# Patient Record
Sex: MALE | Race: ASIAN | NOT HISPANIC OR LATINO
[De-identification: names, ages, dates, MRNs, and addresses within clinical notes are randomized per-mention and may not be internally consistent; named-entity substitution may affect disease eponyms.]

---

## 2017-11-29 ENCOUNTER — APPOINTMENT (OUTPATIENT)
Dept: RADIOLOGY | Facility: HOSPITAL | Age: 77
End: 2017-11-29
Payer: MEDICARE

## 2017-11-29 ENCOUNTER — OUTPATIENT (OUTPATIENT)
Dept: OUTPATIENT SERVICES | Facility: HOSPITAL | Age: 77
LOS: 1 days | End: 2017-11-29
Payer: COMMERCIAL

## 2017-11-29 DIAGNOSIS — Z00.8 ENCOUNTER FOR OTHER GENERAL EXAMINATION: ICD-10-CM

## 2017-11-29 PROCEDURE — 71020: CPT | Mod: 26

## 2017-11-29 PROCEDURE — 71046 X-RAY EXAM CHEST 2 VIEWS: CPT

## 2018-02-13 ENCOUNTER — APPOINTMENT (OUTPATIENT)
Dept: NEUROSURGERY | Facility: CLINIC | Age: 78
End: 2018-02-13
Payer: MEDICARE

## 2018-02-13 VITALS
BODY MASS INDEX: 30.65 KG/M2 | HEIGHT: 63 IN | WEIGHT: 173 LBS | HEART RATE: 57 BPM | SYSTOLIC BLOOD PRESSURE: 128 MMHG | DIASTOLIC BLOOD PRESSURE: 74 MMHG

## 2018-02-13 DIAGNOSIS — M54.16 RADICULOPATHY, LUMBAR REGION: ICD-10-CM

## 2018-02-13 DIAGNOSIS — M51.36 OTHER INTERVERTEBRAL DISC DEGENERATION, LUMBAR REGION: ICD-10-CM

## 2018-02-13 DIAGNOSIS — M48.061 SPINAL STENOSIS, LUMBAR REGION WITHOUT NEUROGENIC CLAUDICATION: ICD-10-CM

## 2018-02-13 PROCEDURE — 99213 OFFICE O/P EST LOW 20 MIN: CPT

## 2018-02-26 ENCOUNTER — APPOINTMENT (OUTPATIENT)
Dept: INFECTIOUS DISEASE | Facility: CLINIC | Age: 78
End: 2018-02-26
Payer: SELF-PAY

## 2018-02-26 DIAGNOSIS — Z71.89 OTHER SPECIFIED COUNSELING: ICD-10-CM

## 2018-02-26 PROCEDURE — 90717 YELLOW FEVER VACCINE SUBQ: CPT

## 2018-02-26 PROCEDURE — 90471 IMMUNIZATION ADMIN: CPT | Mod: NC

## 2018-02-26 PROCEDURE — 99401 PREV MED CNSL INDIV APPRX 15: CPT | Mod: 25

## 2018-02-26 RX ORDER — AZITHROMYCIN 250 MG/1
250 TABLET, FILM COATED ORAL
Qty: 6 | Refills: 0 | Status: ACTIVE | COMMUNITY
Start: 2018-02-26 | End: 1900-01-01

## 2018-04-09 ENCOUNTER — APPOINTMENT (OUTPATIENT)
Dept: NEUROSURGERY | Facility: CLINIC | Age: 78
End: 2018-04-09

## 2020-06-08 ENCOUNTER — APPOINTMENT (OUTPATIENT)
Dept: ORTHOPEDIC SURGERY | Facility: CLINIC | Age: 80
End: 2020-06-08
Payer: MEDICARE

## 2020-06-08 DIAGNOSIS — M75.41 IMPINGEMENT SYNDROME OF RIGHT SHOULDER: ICD-10-CM

## 2020-06-08 DIAGNOSIS — M75.42 IMPINGEMENT SYNDROME OF LEFT SHOULDER: ICD-10-CM

## 2020-06-08 PROCEDURE — 99203 OFFICE O/P NEW LOW 30 MIN: CPT | Mod: 25

## 2020-06-08 PROCEDURE — 73030 X-RAY EXAM OF SHOULDER: CPT | Mod: 50

## 2020-06-08 PROCEDURE — 20600 DRAIN/INJ JOINT/BURSA W/O US: CPT | Mod: RT

## 2020-06-08 RX ORDER — DICLOFENAC SODIUM 75 MG/1
75 TABLET, DELAYED RELEASE ORAL
Qty: 60 | Refills: 2 | Status: ACTIVE | COMMUNITY
Start: 2020-06-08 | End: 1900-01-01

## 2020-06-08 NOTE — HISTORY OF PRESENT ILLNESS
[de-identified] : DEEJAY PELLETIER is a 79 year male presenting to the office complaining of bilateral shoulder pain. Patient reports worsening pain for approximately 6 months . Patient denies injury or trauma to the area. The patient describes the pain as a dull aching, and occasionally sharp pain localized to the anterior aspect of his bilateral shoulder that is intermittent in nature R>L His symptoms are exacerbated with any movement of the shoulder. Patient reports the pain on the right is waking him up at night. \par Patient is taking Tylenol for pain relief with mild relief in symptoms. Of note patient is s/p bilateral rotator cuff repairs. The left shoulder 10/2012 performed by Dr. Pritchard  and the right in 2011 by Dr. Eller.  Patient also reports right thumb pain. Patient has a history of basilar joint osteoarthritis. Patient reports stiffness and pain localized to the base of the thumb. The pain is increased with activity and repetitive motions and alleviated with rest. Patient denies any other complaints at this time.\par

## 2020-06-08 NOTE — PROCEDURE
[de-identified] : At this point I recommended a therapeutic injection and under sterile precautions an injection of 1 cc 1% lidocaine with 0.25 cc of Kenalog and 0.25 cc of Dexamethasone - was placed into the joint of the Right basilar joint without complication, and after several minutes, the patient felt significant relief.\par \par \par

## 2020-06-08 NOTE — PHYSICAL EXAM
[de-identified] : Right Upper Extremity\par Right Upper Extremity\par o Hand :\par ¦ Inspection/Palpation : tenderness to palpation basialr thumb joint or pain with axial compression\par ¦ Range of Motion : full arc of motion in the small joints of the hand, no discomfort elicited\par ¦ Strength : all intrinsic and extrinsic hand muscles 5/5\par ¦ Stability : no joint instability on provocative testing\par o Muscle Bulk : no atrophy\par o Sensation : sensation intact to light touch\par o Skin : no skin lesions or discoloration\par o Vascular Exam : no edema or cyanosis, radial and ulnar pulses normal\par \par o Shoulder :\par ¦ Inspection/Palpation : mild tenderness to palpation greater tuberosity, no swelling, no deformities, scapular protraction.\par ¦ Range of Motion : ACTIVE FORWARD ELEVATION: Measured at 110 degrees with pain, ACTIVE EXTERNAL ROTATION: Measured at 15 degrees, ACTIVE INTERNAL ROTATION: Measured at L5 degrees\par ¦ Strength : external rotation 3/5, internal rotation 5/5, supraspinatus 3/5\par ¦ Stability : no joint instability on provocative testing\par ¦ Tests/Signs : Neer (+), Lockwood (+)\par o Upper Arm : no tenderness, no swelling, no deformities\par o Muscle Bulk : no atrophy\par o Sensation : sensation intact to light touch\par o Skin : no skin rash or discoloration\par o Vascular Exam : no edema, no cyanosis, radial and ulnar pulses normal\par \par Left Upper Extremity\par o Shoulder :\par ¦ Inspection/Palpation : no tenderness, no swelling, no deformities\par ¦ Range of Motion : ACTIVE FORWARD ELEVATION: Measured at 150 degrees, ACTIVE EXTERNAL ROTATION: Measured at 40 degrees, ACTIVE INTERNAL ROTATION: Measured at L4\par ¦ Strength : external rotation 5/5, internal rotation 5/5, supraspinatus 5/5\par ¦ Stability : no joint instability on provocative testing\par ¦ Tests/Signs : Neer (-), Lockwood (+)\par o Upper Arm : no tenderness, no swelling, no deformities\par o Muscle Bulk : no atrophy\par o Sensation : sensation intact to light touch\par o Skin : no skin rash or discoloration\par o Vascular Exam : no edema, no cyanosis, radial and ulnar pulses normal [de-identified] : o Right Shoulder : Internal/External rotation, and outlet views were obtained, there are no soft tissue abnormalities, no fractures, alignment is normal, 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

## 2020-06-08 NOTE — DISCUSSION/SUMMARY
[de-identified] : The underlying pathophysiology was reviewed in great detail with the patient as well as the various treatment options, including ice, analgesics, NSAIDs, Physical therapy, steroid injections.\par \par A prescription was provided for a MRI of the right shoulder to rule out rotator cuff tear. \par \par A prescription was provided for Diclofenac. \par \par A corticosteroid injection of the right basilar thumb joint was performed today. \par \par FU once imaging is obtained.\par \par All questions were answered, all alternatives discussed and the patient is in complete agreement with that plan. Follow-up appointment as instructed. Any issues and the patient will call or come in sooner.\par

## 2020-06-12 ENCOUNTER — OUTPATIENT (OUTPATIENT)
Dept: OUTPATIENT SERVICES | Facility: HOSPITAL | Age: 80
LOS: 1 days | End: 2020-06-12
Payer: MEDICARE

## 2020-06-12 ENCOUNTER — APPOINTMENT (OUTPATIENT)
Dept: MRI IMAGING | Facility: HOSPITAL | Age: 80
End: 2020-06-12
Payer: MEDICARE

## 2020-06-12 DIAGNOSIS — M75.121 COMPLETE ROTATOR CUFF TEAR OR RUPTURE OF RIGHT SHOULDER, NOT SPECIFIED AS TRAUMATIC: ICD-10-CM

## 2020-06-12 PROCEDURE — 73221 MRI JOINT UPR EXTREM W/O DYE: CPT

## 2020-06-12 PROCEDURE — 73221 MRI JOINT UPR EXTREM W/O DYE: CPT | Mod: 26,RT

## 2020-07-18 ENCOUNTER — EMERGENCY (EMERGENCY)
Facility: HOSPITAL | Age: 80
LOS: 1 days | Discharge: HOME | End: 2020-07-18
Attending: INTERNAL MEDICINE | Admitting: INTERNAL MEDICINE
Payer: MEDICARE

## 2020-07-18 VITALS — TEMPERATURE: 98 F

## 2020-07-18 VITALS
HEART RATE: 60 BPM | OXYGEN SATURATION: 98 % | HEIGHT: 63 IN | DIASTOLIC BLOOD PRESSURE: 66 MMHG | RESPIRATION RATE: 16 BRPM | WEIGHT: 169.09 LBS | SYSTOLIC BLOOD PRESSURE: 181 MMHG

## 2020-07-18 DIAGNOSIS — S61.412A LACERATION WITHOUT FOREIGN BODY OF LEFT HAND, INITIAL ENCOUNTER: ICD-10-CM

## 2020-07-18 PROCEDURE — 99283 EMERGENCY DEPT VISIT LOW MDM: CPT | Mod: 25

## 2020-07-18 PROCEDURE — 90715 TDAP VACCINE 7 YRS/> IM: CPT

## 2020-07-18 PROCEDURE — 99283 EMERGENCY DEPT VISIT LOW MDM: CPT

## 2020-07-18 PROCEDURE — 90471 IMMUNIZATION ADMIN: CPT

## 2020-07-18 RX ORDER — TETANUS TOXOID, REDUCED DIPHTHERIA TOXOID AND ACELLULAR PERTUSSIS VACCINE, ADSORBED 5; 2.5; 8; 8; 2.5 [IU]/.5ML; [IU]/.5ML; UG/.5ML; UG/.5ML; UG/.5ML
0.5 SUSPENSION INTRAMUSCULAR ONCE
Refills: 0 | Status: COMPLETED | OUTPATIENT
Start: 2020-07-18 | End: 2020-07-18

## 2020-07-18 RX ADMIN — TETANUS TOXOID, REDUCED DIPHTHERIA TOXOID AND ACELLULAR PERTUSSIS VACCINE, ADSORBED 0.5 MILLILITER(S): 5; 2.5; 8; 8; 2.5 SUSPENSION INTRAMUSCULAR at 13:38

## 2020-07-18 NOTE — ED PROVIDER NOTE - CLINICAL SUMMARY MEDICAL DECISION MAKING FREE TEXT BOX
laceration of dorsum left hand obtained 24 hours ago- wound care, steri strips, Tdap, strict return infection precautions discussed

## 2020-07-18 NOTE — ED PROVIDER NOTE - OBJECTIVE STATEMENT
80 y/o M h/o HTN pw laceration to the dorsum of his left hand obtained yesterday at 1pm (24 hours ago) caught on the edge of an air conditioner. Denies redness, discharge, weakness, numbness, tingling, other injuries. Last tdap unknown.

## 2020-07-18 NOTE — ED PROVIDER NOTE - NSFOLLOWUPINSTRUCTIONS_ED_ALL_ED_FT
Sterile Tape Wound Care    Some cuts and wounds can be closed using sterile tape, also called skin adhesive strips. Skin adhesive strips can be used for shallow (superficial) and simple cuts, wounds, lacerations, and some surgical incisions. These strips act in place of stitches, or in addition to stitches, to hold the edges of the wound together to allow for better healing. Unlike stitches, the adhesive strips do not require needles or anesthetic medicine for placement. The strips usually fall off on their own as the wound is healing. It is important to take proper care of your wound at home while it heals.  How to care for a sterile tape wound     Try to keep the area around your wound clean and dry. Do not allow the adhesive strips to get wet for the first 12 hours.Do not use any soaps or ointments on the wound for the first 12 hours.If a bandage (dressing) has been applied, keep it dry.Follow instructions from your health care provider about how often to change the dressing.  Wash your hands with soap and water before you change your dressing. If soap and water are not available, use hand .Change your dressing as told by your health care provider.Leave adhesive strips in place. These skin closures may need to stay in place for 2 weeks or longer. If adhesive strip edges start to loosen and curl up, you may trim the loose edges. Do not remove adhesive strips completely unless your health care provider tells you to do that.Do not scratch, rub, or pick at the wound area.Protect the wound from further injury until it is healed.Protect the wound from sun and tanning bed exposure while it is healing, and for several weeks after healing.Check the wound every day for signs of infection. Check for:  More redness, swelling, or pain.More fluid or blood.Warmth.Pus or a bad smell.Follow these instructions at home:  Take over-the-counter and prescription medicines only as told by your health care provider.Keep all follow-up visits as told by your health care provider. This is important.Contact a health care provider if:  Your adhesive strips become soaked with blood or fall off before the wound has healed. The tape will need to be replaced.You have a fever.Get help right away if:  You have chills.You develop a rash after the strips are applied.You have a red streak that goes away from the wound.You have more redness, swelling, or pain around your wound.You have more fluid or blood coming from your wound.Your wound feels warm to the touch.You have pus or a bad smell coming from your wound.Your wound breaks open.

## 2020-07-18 NOTE — ED PROVIDER NOTE - PATIENT PORTAL LINK FT
You can access the FollowMyHealth Patient Portal offered by [facility] by registering at the following website: [email or web address]. By joining [facility]’s FollowMyHealth portal, you will also be able to view your health information using other applications (apps) compatible with our system.

## 2020-07-18 NOTE — ED PROVIDER NOTE - ATTENDING CONTRIBUTION TO CARE
laceration of dorsum left hand obtained 24 hours ago- wound care, steri strips, Tdap, strict return infection precautions discussed  Dr. Main:  I have reviewed and discussed with the PA/ resident the case specifics, including the history, physical assessment, evaluation, conclusion, laboratory results, and medical plan. I agree with the contents, and conclusions. I have personally examined, and interviewed the patient.

## 2020-09-10 PROBLEM — I10 ESSENTIAL (PRIMARY) HYPERTENSION: Chronic | Status: ACTIVE | Noted: 2020-07-18

## 2020-09-17 ENCOUNTER — APPOINTMENT (OUTPATIENT)
Dept: ORTHOPEDIC SURGERY | Facility: CLINIC | Age: 80
End: 2020-09-17
Payer: MEDICARE

## 2020-09-17 VITALS — BODY MASS INDEX: 27.49 KG/M2 | HEIGHT: 65 IN | WEIGHT: 165 LBS

## 2020-09-17 DIAGNOSIS — M75.121 COMPLETE ROTATOR CUFF TEAR OR RUPTURE OF RIGHT SHOULDER, NOT SPECIFIED AS TRAUMATIC: ICD-10-CM

## 2020-09-17 PROCEDURE — 99214 OFFICE O/P EST MOD 30 MIN: CPT | Mod: 25

## 2020-09-17 PROCEDURE — 20600 DRAIN/INJ JOINT/BURSA W/O US: CPT | Mod: RT

## 2020-09-17 RX ORDER — DICLOFENAC SODIUM 10 MG/G
1 GEL TOPICAL DAILY
Qty: 1 | Refills: 3 | Status: ACTIVE | COMMUNITY
Start: 2020-09-17 | End: 1900-01-01

## 2020-09-17 NOTE — PHYSICAL EXAM
[de-identified] : Right Upper Extremity\par \par o Hand :\par ¦ Inspection/Palpation :  tenderness to palpation basilar thumb joint with palpable crepitus no pain with axial compression\par ¦ Range of Motion : full arc of motion in the small joints of the hand, no discomfort elicited\par ¦ Strength : all intrinsic and extrinsic hand muscles 5/5\par ¦ Stability : no joint instability on provocative testing\par o Muscle Bulk : no atrophy\par o Sensation : sensation intact to light touch\par o Skin : no skin lesions or discoloration\par o Vascular Exam : no edema or cyanosis, radial and ulnar pulses normal\par \par o Shoulder :\par ¦ Inspection/Palpation : mild tenderness to palpation greater tuberosity, no swelling, no deformities, scapular protraction.\par ¦ Range of Motion : ACTIVE FORWARD ELEVATION: Measured at 145 degrees with pain, ACTIVE EXTERNAL ROTATION: Measured at 30 degrees, ACTIVE INTERNAL ROTATION: Measured at L5 degrees\par ¦ Strength : external rotation 5/5, internal rotation 5/5, supraspinatus 5/5\par ¦ Stability : no joint instability on provocative testing\par ¦ Tests/Signs : Neer (+), Lockwood (+)\par o Upper Arm : no tenderness, no swelling, no deformities\par o Muscle Bulk : no atrophy\par o Sensation : sensation intact to light touch\par o Skin : no skin rash or discoloration\par o Vascular Exam : no edema, no cyanosis, radial and ulnar pulses normal\par \par Left Upper Extremity\par o Shoulder :\par ¦ Inspection/Palpation : no tenderness, no swelling, no deformities\par ¦ Range of Motion : ACTIVE FORWARD ELEVATION: Measured at 150 degrees, ACTIVE EXTERNAL ROTATION: Measured at 40 degrees, ACTIVE INTERNAL ROTATION: Measured at L4\par ¦ Strength : external rotation 5/5, internal rotation 5/5, supraspinatus 5/5\par ¦ Stability : no joint instability on provocative testing\par ¦ Tests/Signs : Neer (-), Lockwood (+)\par o Upper Arm : no tenderness, no swelling, no deformities\par o Muscle Bulk : no atrophy\par o Sensation : sensation intact to light touch\par o Skin : no skin rash or discoloration\par o Vascular Exam : no edema, no cyanosis, radial and ulnar pulses normal [de-identified] : o MRI of the right shoulder performed on 06/12/2020 at [facility] : Impression \par ¦ Metal artifact in the humeral head is consistent with history of rotator cuff repair. \par ¦ This artifact severely limits evaluation of the rotator cuff. \par ¦ Evaluation of the supraspinatus and teres minor tendons is severely limited. \par ¦ No definite full-thickness tear is seen in the infraspinatus tendon or subscapularis tendon. \par ¦ Small fluid in the subacromial/subdeltoid bursa. \par If there is clinical need for further assessment of the rotator cuff, CT arthrogram of the shoulder or ultrasound can be performed. \par ¦ Arthritic changes of the AC joint and glenohumeral joint with small osteophytes.

## 2020-09-17 NOTE — DISCUSSION/SUMMARY
[de-identified] : The underlying pathophysiology was reviewed in great detail with the patient as well as the various treatment options, including ice, analgesics, NSAIDs, Physical therapy, steroid injections.\par \par MRI of the right shoulder was reviewed and discussed in great detail today. due to improvements in strength, range of motion and pain, patient wishes to proceed with conservative treatment at this time. If pain persists, may proceed with CT arthrogram or US evaluation of the right shoulder to rule out rotator cuff tear. MRI of the right shoulder was indeterminate for retear of the rotator cuff. \par \par A prescription was provided for Voltaren. \par \par A corticosteroid injection of the right basilar thumb joint was performed today. If pain persists, advised patient to follow up with Dr. Crain for possible surgical intervention. \par \par FU 6 weeks or prn. \par \par All questions were answered, all alternatives discussed and the patient is in complete agreement with that plan. Follow-up appointment as instructed. Any issues and the patient will call or come in sooner.\par

## 2020-09-17 NOTE — PROCEDURE
[de-identified] : At this point I recommended a therapeutic injection and under sterile precautions an injection of 0.5 cc 1% lidocaine with 0.25 cc of Kenalog and 0.25 cc of Dexamethasone - was placed into the joint of the Right basilar joint without complication, and after several minutes, the patient felt significant relief.\par \par \par

## 2020-09-17 NOTE — HISTORY OF PRESENT ILLNESS
[de-identified] : DEEJAY PELLETIER is a 79 year male presenting to the office for follow up of right shoulder and right thumb pain. Patient reports worsening pain for approximately 6 months . Patient denies injury or trauma to the area. The patient describes the pain as a dull aching, and occasionally sharp pain localized to the anterior aspect of his bilateral shoulder that is intermittent in nature R>L His symptoms are exacerbated with any movement of the shoulder. Patient reports the pain on the right is waking him up at night. \par Patient is taking Tylenol for pain relief with mild relief in symptoms. Of note patient is s/p bilateral rotator cuff repairs. The left shoulder 10/2012 performed by Dr. Pritchard  and the right in 2011 by Dr. Eller.  \par Last visit he received a cortisone injection to the right thumb that did give good pain relief, although temporary. He is interested in repeating this injection. Patient has a history of basilar joint osteoarthritis. Patient reports stiffness and pain localized to the base of the thumb. The pain is increased with activity and repetitive motions and alleviated with rest. Patient denies any other complaints at this time.

## 2021-01-14 ENCOUNTER — APPOINTMENT (OUTPATIENT)
Dept: ORTHOPEDIC SURGERY | Facility: CLINIC | Age: 81
End: 2021-01-14
Payer: MEDICARE

## 2021-01-14 VITALS
HEART RATE: 85 BPM | WEIGHT: 165 LBS | DIASTOLIC BLOOD PRESSURE: 65 MMHG | HEIGHT: 65 IN | BODY MASS INDEX: 27.49 KG/M2 | SYSTOLIC BLOOD PRESSURE: 167 MMHG

## 2021-01-14 PROCEDURE — 99213 OFFICE O/P EST LOW 20 MIN: CPT | Mod: 25

## 2021-01-14 PROCEDURE — 20600 DRAIN/INJ JOINT/BURSA W/O US: CPT | Mod: RT

## 2021-01-14 NOTE — PROCEDURE
[de-identified] : At this point I recommended a therapeutic injection and under sterile precautions an injection of 0.5 cc 1% lidocaine with 0.25 cc of Kenalog and 0.25 cc of Dexamethasone - was placed into the joint of the Right basilar joint without complication, and after several minutes, the patient felt significant relief.\par \par \par

## 2021-01-14 NOTE — DISCUSSION/SUMMARY
[de-identified] : The underlying pathophysiology was reviewed in great detail with the patient as well as the various treatment options, including ice, analgesics, NSAIDs, Physical therapy, steroid injections.\par \par A corticosteroid injection of the right basilar thumb joint was performed today. If pain persists, advised patient to follow up with Dr. Crain for possible surgical intervention. \par \par continue use of Volaten gel for pain relief. \par \par FU 6 weeks or prn. \par \par All questions were answered, all alternatives discussed and the patient is in complete agreement with that plan. Follow-up appointment as instructed. Any issues and the patient will call or come in sooner.\par

## 2021-01-14 NOTE — PHYSICAL EXAM
[de-identified] : Right Upper Extremity\par o Hand :\par ¦ Inspection/Palpation :  tenderness to palpation basilar thumb joint with palpable crepitus no pain with axial compression\par ¦ Range of Motion : full arc of motion in the small joints of the hand, no discomfort elicited\par ¦ Strength : all intrinsic and extrinsic hand muscles 5/5\par ¦ Stability : no joint instability on provocative testing\par o Muscle Bulk : no atrophy\par o Sensation : sensation intact to light touch\par o Skin : no skin lesions or discoloration\par o Vascular Exam : no edema or cyanosis, radial and ulnar pulses normal

## 2021-01-14 NOTE — HISTORY OF PRESENT ILLNESS
[de-identified] : DEEJAY PELLETIER is a 79 year male presenting to the office for follow up of right thumb pain. At his last visit on 09/17/2020 he received a cortisone injection to the right thumb that did give good pain relief, although temporary. He is interested in repeating this injection. Patient has a history of basilar joint osteoarthritis. Patient reports stiffness and pain localized to the base of the thumb. The pain is increased with activity and repetitive motions and alleviated with rest. Patient denies any other complaints at this time.

## 2021-07-01 ENCOUNTER — APPOINTMENT (OUTPATIENT)
Dept: ORTHOPEDIC SURGERY | Facility: CLINIC | Age: 81
End: 2021-07-01
Payer: MEDICARE

## 2021-07-01 PROCEDURE — 99214 OFFICE O/P EST MOD 30 MIN: CPT | Mod: 25

## 2021-07-01 PROCEDURE — 20600 DRAIN/INJ JOINT/BURSA W/O US: CPT | Mod: 50

## 2021-07-01 PROCEDURE — 73130 X-RAY EXAM OF HAND: CPT | Mod: 50

## 2021-07-03 NOTE — ADDENDUM
[FreeTextEntry1] : I, Aracely Santos wrote this note acting as a scribe for Dr. Pan Crain on Jul 01, 2021.\par \par \par

## 2021-07-03 NOTE — HISTORY OF PRESENT ILLNESS
[FreeTextEntry1] : DEEJAY PELLETIER is a 80 year male who presents for initial evaluation of bilateral hand pain which began 5-6 years ago. R>L.  Patient has been treated by Dr. Mason with multiple cortisone injections, the last of which was given on 1/14/21. He has bruising over the right basal joint as a vein was hit when he last received a cortisone injection. He reports stiffness and pain localized to the base of both thumbs. The pain is increased with activity and repetitive motions and alleviated with rest. Patient denies any other complaints at this time. He is an avid golfer. Of note, patient is retired. \par  \par

## 2021-07-03 NOTE — DISCUSSION/SUMMARY
[FreeTextEntry1] : The underlying pathophysiology was reviewed with the patient. XR films were reviewed with the patient. Discussed at length the nature of the patient’s condition. 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 \par \par The patient wishes to proceed with a cortisone injection at this time. Under sterile precautions, an injection of 0.5 cc 1% lidocaine with 0.25 cc of Kenalog and 0.25 cc of Dexamethasone was administered into the right basal joint. The patient tolerated the procedure well. Rest and apply ice. \par \par The patient wishes to proceed with a cortisone injection at this time. Under sterile precautions, an injection of 0.5 cc 1% lidocaine with 0.25 cc of Kenalog and 0.25 cc of Dexamethasone was administered into the left basal joint. The patient tolerated the procedure well. Rest and apply ice. \par \par Patient advised that surgery may be indicated if the duration of relief lasts only 1-2 months. \par \par Patient can continue activities as tolerated. All questions answered, understanding verbalized. Patient in agreement with plan of care. Follow up as needed.

## 2021-07-03 NOTE — END OF VISIT
[FreeTextEntry3] : All medical record entries made by the Scribe were at my,  Dr. Pan Crain MD., direction and personally dictated by me on 07/01/2021. I have personally reviewed the chart and agree that the record accurately reflects my personal performance of the history, physical exam, assessment and plan.\par

## 2021-10-07 ENCOUNTER — APPOINTMENT (OUTPATIENT)
Dept: ORTHOPEDIC SURGERY | Facility: CLINIC | Age: 81
End: 2021-10-07
Payer: MEDICARE

## 2021-10-07 VITALS — BODY MASS INDEX: 27.82 KG/M2 | WEIGHT: 167 LBS | HEIGHT: 65 IN

## 2021-10-07 PROCEDURE — 20600 DRAIN/INJ JOINT/BURSA W/O US: CPT | Mod: 50

## 2021-10-07 PROCEDURE — 99213 OFFICE O/P EST LOW 20 MIN: CPT | Mod: 25

## 2021-10-07 NOTE — DISCUSSION/SUMMARY
[FreeTextEntry1] : The underlying pathophysiology was reviewed with the patient.  Discussed at length the nature of the patient’s condition. 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 \par \par Basal joint arthroplasty was discussed at this time. As he would like to wait until the winter to schedule surgery, I am recommending repeat cortisone injections today into the CMC joint of both the right and left thumbs.\par \par The patient wishes to proceed with a cortisone injection at this time. Under sterile precautions, an injection of 0.5 cc 1% lidocaine with 0.25 cc of Kenalog and 0.25 cc of Dexamethasone was administered into the right basal joint. The patient tolerated the procedure well. Rest and apply ice. \par \par The patient wishes to proceed with a cortisone injection at this time. Under sterile precautions, an injection of 0.5 cc 1% lidocaine with 0.25 cc of Kenalog and 0.25 cc of Dexamethasone was administered into the left basal joint. The patient tolerated the procedure well. Rest and apply ice. \par \par Patient can continue activities as tolerated. All questions answered, understanding verbalized. Patient in agreement with plan of care.

## 2021-10-07 NOTE — PHYSICAL EXAM
[de-identified] : Patient is WDWN, alert, and in no acute distress. Breathing is unlabored. He is grossly oriented to person, place, \par and time.\par \par Right Hand:\par There is basal joint tenderness. Positive grind test.There is ecchymosis/vascular discoloration along dorsal aspect of thumb CMC joint that developed following prior injection.Full arc of motion in the fingers with pain on thumb ROM. All intrinsic and \par extrinsic hand muscles 5/5. No joint instability on provocative testing. Sensation is intact to light touch. \par \par \par Left Hand: \par There is basal joint tenderness. Full arc of motion in the fingers with pain on thumb ROM. Positive grind test.All intrinsic and \par extrinsic hand muscles 5/5. No joint instability on provocative testing. Sensation is intact to light touch. \par There are no skin lesions or discoloration.  [de-identified] : no imaging today

## 2021-10-07 NOTE — ADDENDUM
[FreeTextEntry1] : I, Aracely Santos wrote this note acting as a scribe for Dr. Pan Crain on Oct 07, 2021.

## 2021-10-07 NOTE — END OF VISIT
[FreeTextEntry3] : All medical record entries made by the Scribe were at my,  Dr. Pan Crain MD., direction and personally dictated by me on 10/07/2021. I have personally reviewed the chart and agree that the record accurately reflects my personal performance of the history, physical exam, assessment and plan.

## 2021-10-07 NOTE — HISTORY OF PRESENT ILLNESS
[FreeTextEntry1] : DEEJAY PELLETIER is a 80 year male who presents for a follow up evaluation of bilateral hand pain which began 5-6 years ago. R>L. Patient has been treated by Dr. Mason with multiple cortisone injections, the last of which was given on 1/14/21. He was last treated in office on 7/1/21 with bilateral cortisone injections into the basal joints. He notes some relief but has returned today as the pain has returned. He is requesting repeat cortisone injections today on 10/7/21.

## 2021-11-14 ENCOUNTER — TRANSCRIPTION ENCOUNTER (OUTPATIENT)
Age: 81
End: 2021-11-14

## 2022-03-18 ENCOUNTER — OUTPATIENT (OUTPATIENT)
Dept: OUTPATIENT SERVICES | Facility: HOSPITAL | Age: 82
LOS: 1 days | End: 2022-03-18
Payer: MEDICARE

## 2022-03-18 ENCOUNTER — APPOINTMENT (OUTPATIENT)
Dept: ULTRASOUND IMAGING | Facility: HOSPITAL | Age: 82
End: 2022-03-18
Payer: MEDICARE

## 2022-03-18 DIAGNOSIS — Z00.8 ENCOUNTER FOR OTHER GENERAL EXAMINATION: ICD-10-CM

## 2022-03-18 PROCEDURE — 93970 EXTREMITY STUDY: CPT

## 2022-03-18 PROCEDURE — 93970 EXTREMITY STUDY: CPT | Mod: 26

## 2022-04-12 ENCOUNTER — APPOINTMENT (OUTPATIENT)
Dept: ORTHOPEDIC SURGERY | Facility: CLINIC | Age: 82
End: 2022-04-12
Payer: MEDICARE

## 2022-04-12 VITALS — WEIGHT: 160 LBS | HEIGHT: 65 IN | BODY MASS INDEX: 26.66 KG/M2

## 2022-04-12 PROCEDURE — 99213 OFFICE O/P EST LOW 20 MIN: CPT | Mod: 25

## 2022-04-12 PROCEDURE — 20600 DRAIN/INJ JOINT/BURSA W/O US: CPT | Mod: 50

## 2022-04-12 NOTE — HISTORY OF PRESENT ILLNESS
[FreeTextEntry1] : Patient is a 81 year old male who presents with complaints of bilateral hand pain which began years ago. 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 He was treated at that time with bilateral CMC joint injections. He had also previously in the past been treated with cortisone injections to the same area by Dr. Pritchard. He returns on 4/12/22 with recurrent symptoms and is requesting repeat injections once again.

## 2022-04-12 NOTE — END OF VISIT
[FreeTextEntry3] : All medical record entries made by the Scribe were at my,  Dr. Pan Crain MD., direction and personally dictated by me on 04/12/2022. I have personally reviewed the chart and agree that the record accurately reflects my personal performance of the history, physical exam, assessment and plan.

## 2022-04-12 NOTE — DISCUSSION/SUMMARY
[FreeTextEntry1] : The underlying pathophysiology was reviewed with the patient. Discussed at length the nature of the patient’s condition. 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 \par \par  am recommending repeat cortisone injections today into the CMC joint of both the right and left thumbs given the recurrence of his symptoms. \par \par The patient wishes to proceed with a cortisone injection at this time. Under sterile precautions, an injection of 0.5 cc 1% lidocaine with 0.25 cc of Kenalog and 0.25 cc of Dexamethasone was administered into the right basal joint. The patient tolerated the procedure well. Rest and apply ice. \par \par The patient wishes to proceed with a cortisone injection at this time. Under sterile precautions, an injection of 0.5 cc 1% lidocaine with 0.25 cc of Kenalog and 0.25 cc of Dexamethasone was administered into the left basal joint. The patient tolerated the procedure well. Rest and apply ice. \par \par Finally, I did discuss with him that ultimately if the cortisone injections fail, basal joint arthroplasty would be the treatment option. He understands this and will return to the office on an as needed basis, according to his symptoms. \par \par All questions answered, understanding verbalized. Patient in agreement with plan of care.

## 2022-04-12 NOTE — ADDENDUM
[FreeTextEntry1] : I, Aracely Santos wrote this note acting as a scribe for Dr. Pan Crain on Apr 12, 2022.

## 2022-04-12 NOTE — PHYSICAL EXAM
[de-identified] : Patient is WDWN, alert, and in no acute distress. Breathing is unlabored. He is grossly oriented to person, place, and time.\par \par Right Hand:\par There is basal joint tenderness. Positive grind test.There is ecchymosis/vascular discoloration along dorsal aspect of thumb CMC joint that developed following prior injection.Full arc of motion in the fingers with pain on thumb ROM. All intrinsic and \par extrinsic hand muscles 5/5. No joint instability on provocative testing. Sensation is intact to light touch. \par \par \par Left Hand: \par There is basal joint tenderness. Full arc of motion in the fingers with pain on thumb ROM. Positive grind test.All intrinsic and \par extrinsic hand muscles 5/5. No joint instability on provocative testing. Sensation is intact to light touch. \par There are no skin lesions or discoloration.  [de-identified] : no imaging today

## 2022-06-13 ENCOUNTER — OUTPATIENT (OUTPATIENT)
Dept: OUTPATIENT SERVICES | Facility: HOSPITAL | Age: 82
LOS: 1 days | End: 2022-06-13
Payer: MEDICARE

## 2022-06-13 DIAGNOSIS — Z20.828 CONTACT WITH AND (SUSPECTED) EXPOSURE TO OTHER VIRAL COMMUNICABLE DISEASES: ICD-10-CM

## 2022-06-13 LAB — SARS-COV-2 RNA SPEC QL NAA+PROBE: SIGNIFICANT CHANGE UP

## 2022-06-13 PROCEDURE — U0005: CPT

## 2022-06-13 PROCEDURE — U0003: CPT

## 2022-06-14 ENCOUNTER — TRANSCRIPTION ENCOUNTER (OUTPATIENT)
Age: 82
End: 2022-06-14

## 2022-06-15 ENCOUNTER — TRANSCRIPTION ENCOUNTER (OUTPATIENT)
Age: 82
End: 2022-06-15

## 2022-06-15 ENCOUNTER — OUTPATIENT (OUTPATIENT)
Dept: OUTPATIENT SERVICES | Facility: HOSPITAL | Age: 82
LOS: 1 days | End: 2022-06-15
Payer: MEDICARE

## 2022-06-15 VITALS
HEART RATE: 52 BPM | WEIGHT: 169.09 LBS | HEIGHT: 65 IN | DIASTOLIC BLOOD PRESSURE: 54 MMHG | SYSTOLIC BLOOD PRESSURE: 168 MMHG | OXYGEN SATURATION: 97 % | RESPIRATION RATE: 15 BRPM | TEMPERATURE: 97 F

## 2022-06-15 VITALS
TEMPERATURE: 97 F | RESPIRATION RATE: 16 BRPM | DIASTOLIC BLOOD PRESSURE: 69 MMHG | SYSTOLIC BLOOD PRESSURE: 145 MMHG | OXYGEN SATURATION: 97 % | HEART RATE: 57 BPM

## 2022-06-15 DIAGNOSIS — H25.10 AGE-RELATED NUCLEAR CATARACT, UNSPECIFIED EYE: ICD-10-CM

## 2022-06-15 PROCEDURE — V2632: CPT

## 2022-06-15 PROCEDURE — 66984 XCAPSL CTRC RMVL W/O ECP: CPT | Mod: RT

## 2022-06-15 DEVICE — LENS IOL ACRYSOF SN60WF 18.5D
Type: IMPLANTABLE DEVICE | Site: RIGHT | Status: NON-FUNCTIONAL
Removed: 2022-06-15

## 2022-06-15 RX ORDER — TROPICAMIDE 1 %
1 DROPS OPHTHALMIC (EYE)
Refills: 0 | Status: COMPLETED | OUTPATIENT
Start: 2022-06-15 | End: 2022-06-15

## 2022-06-15 RX ORDER — CYCLOPENTOLATE HYDROCHLORIDE 10 MG/ML
1 SOLUTION/ DROPS OPHTHALMIC
Refills: 0 | Status: COMPLETED | OUTPATIENT
Start: 2022-06-15 | End: 2022-06-15

## 2022-06-15 RX ORDER — SODIUM CHLORIDE 9 MG/ML
1000 INJECTION, SOLUTION INTRAVENOUS
Refills: 0 | Status: DISCONTINUED | OUTPATIENT
Start: 2022-06-15 | End: 2022-06-15

## 2022-06-15 RX ORDER — PHENYLEPHRINE HCL 2.5 %
1 DROPS OPHTHALMIC (EYE)
Refills: 0 | Status: COMPLETED | OUTPATIENT
Start: 2022-06-15 | End: 2022-06-15

## 2022-06-15 RX ORDER — KETOROLAC TROMETHAMINE 0.5 %
1 DROPS OPHTHALMIC (EYE)
Refills: 0 | Status: COMPLETED | OUTPATIENT
Start: 2022-06-15 | End: 2022-06-15

## 2022-06-15 RX ORDER — ACETAMINOPHEN 500 MG
650 TABLET ORAL ONCE
Refills: 0 | Status: DISCONTINUED | OUTPATIENT
Start: 2022-06-15 | End: 2022-06-29

## 2022-06-15 RX ADMIN — Medication 1 DROP(S): at 06:48

## 2022-06-15 RX ADMIN — CYCLOPENTOLATE HYDROCHLORIDE 1 DROP(S): 10 SOLUTION/ DROPS OPHTHALMIC at 06:55

## 2022-06-15 RX ADMIN — Medication 1 DROP(S): at 06:53

## 2022-06-15 RX ADMIN — Medication 1 DROP(S): at 06:54

## 2022-06-15 RX ADMIN — Medication 1 DROP(S): at 06:56

## 2022-06-15 RX ADMIN — SODIUM CHLORIDE 40 MILLILITER(S): 9 INJECTION, SOLUTION INTRAVENOUS at 06:46

## 2022-06-15 RX ADMIN — Medication 1 DROP(S): at 06:57

## 2022-06-15 RX ADMIN — CYCLOPENTOLATE HYDROCHLORIDE 1 DROP(S): 10 SOLUTION/ DROPS OPHTHALMIC at 06:47

## 2022-06-15 RX ADMIN — CYCLOPENTOLATE HYDROCHLORIDE 1 DROP(S): 10 SOLUTION/ DROPS OPHTHALMIC at 06:52

## 2022-06-15 NOTE — ASU DISCHARGE PLAN (ADULT/PEDIATRIC) - ASU DC SPECIAL INSTRUCTIONSFT
Keep shield on eye until office visit today at 2 pm  Any problems call office at [phone]    OFFICE VISIT TODAY AT 2 pm  Bring Drops

## 2022-06-15 NOTE — ASU DISCHARGE PLAN (ADULT/PEDIATRIC) - NS MD DC FALL RISK RISK
For information on Fall & Injury Prevention, visit: [email or web address] OR  [email or web address] OR  https://www.cdc.gov/steadi/patient.html

## 2022-06-15 NOTE — BRIEF OPERATIVE NOTE - NSICDXBRIEFPREOP_GEN_ALL_CORE_FT
PRE-OP DIAGNOSIS:  Posterior subcapsular age-related cataract, right eye 15-Jony-2022 08:11:29  Andrew Lerma

## 2022-06-15 NOTE — BRIEF OPERATIVE NOTE - NSICDXBRIEFPOSTOP_GEN_ALL_CORE_FT
POST-OP DIAGNOSIS:  Posterior subcapsular age-related cataract, right eye 15-Jony-2022 08:11:46  Andrew Lerma

## 2022-06-15 NOTE — BRIEF OPERATIVE NOTE - NSICDXBRIEFPROCEDURE_GEN_ALL_CORE_FT
PROCEDURES:  Single stage extracapsular removal of cataract with insertion of intraocular lens prosthesis by phacoemulsification 15-Jony-2022 08:11:08  Andrew Lerma

## 2022-06-15 NOTE — ASU DISCHARGE PLAN (ADULT/PEDIATRIC) - NURSING INSTRUCTIONS
Keep eye shield on until you follow up with your doctor. Avoid putting your hands in your face and especially around your eye area.

## 2022-08-29 ENCOUNTER — OUTPATIENT (OUTPATIENT)
Dept: OUTPATIENT SERVICES | Facility: HOSPITAL | Age: 82
LOS: 1 days | End: 2022-08-29
Payer: MEDICARE

## 2022-08-29 DIAGNOSIS — Z20.828 CONTACT WITH AND (SUSPECTED) EXPOSURE TO OTHER VIRAL COMMUNICABLE DISEASES: ICD-10-CM

## 2022-08-29 LAB — SARS-COV-2 RNA SPEC QL NAA+PROBE: SIGNIFICANT CHANGE UP

## 2022-08-29 PROCEDURE — U0003: CPT

## 2022-08-29 PROCEDURE — U0005: CPT

## 2022-08-30 ENCOUNTER — TRANSCRIPTION ENCOUNTER (OUTPATIENT)
Age: 82
End: 2022-08-30

## 2022-08-30 RX ORDER — METOPROLOL TARTRATE 50 MG
0 TABLET ORAL
Qty: 0 | Refills: 0 | DISCHARGE

## 2022-08-30 NOTE — ASU PATIENT PROFILE, ADULT - FALL HARM RISK - UNIVERSAL INTERVENTIONS
Bed in lowest position, wheels locked, appropriate side rails in place/Call bell, personal items and telephone in reach/Instruct patient to call for assistance before getting out of bed or chair/Non-slip footwear when patient is out of bed/[place] to call system/Physically safe environment - no spills, clutter or unnecessary equipment/Purposeful Proactive Rounding/Room/bathroom lighting operational, light cord in reach

## 2022-08-30 NOTE — ASU PATIENT PROFILE, ADULT - NSICDXPASTMEDICALHX_GEN_ALL_CORE_FT
PAST MEDICAL HISTORY:  Aortic stenosis     Atrial fibrillation ablation in 10/21    Bilateral cataracts     Borderline diabetic     BPH (benign prostatic hyperplasia)     DJD (degenerative joint disease)     Former cigarette smoker     H/O hemorrhoids     H/O hyperthyroidism     History of aortic insufficiency     History of first degree AV block     Hypertension, unspecified type     Mitral regurgitation     Renal insufficiency     Sciatica     Tubular adenoma of colon

## 2022-08-30 NOTE — ASU PATIENT PROFILE, ADULT - NSICDXPASTSURGICALHX_GEN_ALL_CORE_FT
PAST SURGICAL HISTORY:  S/P ablation of atrial flutter      PAST SURGICAL HISTORY:  H/O cataract extraction right eye    History of arthroscopic surgery of shoulder both    S/P ablation of atrial flutter

## 2022-08-31 ENCOUNTER — OUTPATIENT (OUTPATIENT)
Dept: OUTPATIENT SERVICES | Facility: HOSPITAL | Age: 82
LOS: 1 days | End: 2022-08-31
Payer: MEDICARE

## 2022-08-31 ENCOUNTER — TRANSCRIPTION ENCOUNTER (OUTPATIENT)
Age: 82
End: 2022-08-31

## 2022-08-31 VITALS
SYSTOLIC BLOOD PRESSURE: 154 MMHG | OXYGEN SATURATION: 97 % | HEART RATE: 49 BPM | HEIGHT: 64 IN | TEMPERATURE: 97 F | DIASTOLIC BLOOD PRESSURE: 65 MMHG | RESPIRATION RATE: 16 BRPM | WEIGHT: 169.09 LBS

## 2022-08-31 VITALS
DIASTOLIC BLOOD PRESSURE: 70 MMHG | TEMPERATURE: 97 F | RESPIRATION RATE: 14 BRPM | SYSTOLIC BLOOD PRESSURE: 165 MMHG | HEART RATE: 55 BPM | OXYGEN SATURATION: 98 %

## 2022-08-31 DIAGNOSIS — Z98.49 CATARACT EXTRACTION STATUS, UNSPECIFIED EYE: Chronic | ICD-10-CM

## 2022-08-31 DIAGNOSIS — Z98.890 OTHER SPECIFIED POSTPROCEDURAL STATES: Chronic | ICD-10-CM

## 2022-08-31 DIAGNOSIS — H25.12 AGE-RELATED NUCLEAR CATARACT, LEFT EYE: ICD-10-CM

## 2022-08-31 PROCEDURE — 66984 XCAPSL CTRC RMVL W/O ECP: CPT | Mod: LT

## 2022-08-31 PROCEDURE — V2632: CPT

## 2022-08-31 DEVICE — LENS IOL ACRYSOF SN60WF 18.0D
Type: IMPLANTABLE DEVICE | Site: LEFT | Status: NON-FUNCTIONAL
Removed: 2022-08-31

## 2022-08-31 RX ORDER — TROPICAMIDE 1 %
1 DROPS OPHTHALMIC (EYE)
Refills: 0 | Status: COMPLETED | OUTPATIENT
Start: 2022-08-31 | End: 2022-08-31

## 2022-08-31 RX ORDER — CYCLOPENTOLATE HYDROCHLORIDE 10 MG/ML
1 SOLUTION/ DROPS OPHTHALMIC
Refills: 0 | Status: COMPLETED | OUTPATIENT
Start: 2022-08-31 | End: 2022-08-31

## 2022-08-31 RX ORDER — LOSARTAN POTASSIUM 100 MG/1
1 TABLET, FILM COATED ORAL
Qty: 0 | Refills: 0 | DISCHARGE

## 2022-08-31 RX ORDER — PHENYLEPHRINE HCL 2.5 %
1 DROPS OPHTHALMIC (EYE)
Refills: 0 | Status: COMPLETED | OUTPATIENT
Start: 2022-08-31 | End: 2022-08-31

## 2022-08-31 RX ORDER — METOPROLOL TARTRATE 50 MG
1 TABLET ORAL
Qty: 0 | Refills: 0 | DISCHARGE

## 2022-08-31 RX ORDER — APIXABAN 2.5 MG/1
1 TABLET, FILM COATED ORAL
Qty: 0 | Refills: 0 | DISCHARGE

## 2022-08-31 RX ORDER — ROSUVASTATIN CALCIUM 5 MG/1
1 TABLET ORAL
Qty: 0 | Refills: 0 | DISCHARGE

## 2022-08-31 RX ORDER — SODIUM CHLORIDE 9 MG/ML
1000 INJECTION, SOLUTION INTRAVENOUS
Refills: 0 | Status: DISCONTINUED | OUTPATIENT
Start: 2022-08-31 | End: 2022-08-31

## 2022-08-31 RX ORDER — AMLODIPINE BESYLATE 2.5 MG/1
0 TABLET ORAL
Qty: 0 | Refills: 0 | DISCHARGE

## 2022-08-31 RX ORDER — KETOROLAC TROMETHAMINE 0.5 %
1 DROPS OPHTHALMIC (EYE)
Refills: 0 | Status: COMPLETED | OUTPATIENT
Start: 2022-08-31 | End: 2022-08-31

## 2022-08-31 RX ADMIN — Medication 1 DROP(S): at 09:04

## 2022-08-31 RX ADMIN — CYCLOPENTOLATE HYDROCHLORIDE 1 DROP(S): 10 SOLUTION/ DROPS OPHTHALMIC at 08:54

## 2022-08-31 RX ADMIN — Medication 1 DROP(S): at 08:58

## 2022-08-31 RX ADMIN — SODIUM CHLORIDE 40 MILLILITER(S): 9 INJECTION, SOLUTION INTRAVENOUS at 08:53

## 2022-08-31 RX ADMIN — Medication 1 DROP(S): at 08:54

## 2022-08-31 RX ADMIN — Medication 1 DROP(S): at 08:59

## 2022-08-31 RX ADMIN — CYCLOPENTOLATE HYDROCHLORIDE 1 DROP(S): 10 SOLUTION/ DROPS OPHTHALMIC at 08:59

## 2022-08-31 RX ADMIN — Medication 1 DROP(S): at 09:02

## 2022-08-31 RX ADMIN — CYCLOPENTOLATE HYDROCHLORIDE 1 DROP(S): 10 SOLUTION/ DROPS OPHTHALMIC at 09:04

## 2022-08-31 RX ADMIN — Medication 1 DROP(S): at 08:53

## 2022-08-31 NOTE — BRIEF OPERATIVE NOTE - NSICDXBRIEFPREOP_GEN_ALL_CORE_FT
PRE-OP DIAGNOSIS:  Posterior subcapsular age-related cataract, left eye 31-Aug-2022 10:37:21  Andrew Lerma

## 2022-08-31 NOTE — BRIEF OPERATIVE NOTE - NSICDXBRIEFPOSTOP_GEN_ALL_CORE_FT
POST-OP DIAGNOSIS:  Posterior subcapsular age-related cataract, left eye 31-Aug-2022 10:37:40  Andrew Lerma

## 2022-08-31 NOTE — BRIEF OPERATIVE NOTE - NSICDXBRIEFPROCEDURE_GEN_ALL_CORE_FT
PROCEDURES:  Single stage extracapsular removal of cataract with insertion of intraocular lens prosthesis by phacoemulsification 31-Aug-2022 10:36:56  Andrew Lerma

## 2022-08-31 NOTE — ASU DISCHARGE PLAN (ADULT/PEDIATRIC) - ASU DC SPECIAL INSTRUCTIONSFT
Keep shield on eye until office visit today at 2 pm  Any problems call office at [phone]    OFFICE VISIT TODAY AT 2 pm  Bring Drops

## 2022-10-12 PROBLEM — R73.03 PREDIABETES: Chronic | Status: ACTIVE | Noted: 2022-08-30

## 2022-10-12 PROBLEM — H26.9 UNSPECIFIED CATARACT: Chronic | Status: ACTIVE | Noted: 2022-08-30

## 2022-10-12 PROBLEM — N40.0 BENIGN PROSTATIC HYPERPLASIA WITHOUT LOWER URINARY TRACT SYMPTOMS: Chronic | Status: ACTIVE | Noted: 2022-08-30

## 2022-10-12 PROBLEM — Z86.79 PERSONAL HISTORY OF OTHER DISEASES OF THE CIRCULATORY SYSTEM: Chronic | Status: ACTIVE | Noted: 2022-08-30

## 2022-10-12 PROBLEM — I48.91 UNSPECIFIED ATRIAL FIBRILLATION: Chronic | Status: ACTIVE | Noted: 2022-08-30

## 2022-10-12 PROBLEM — M54.30 SCIATICA, UNSPECIFIED SIDE: Chronic | Status: ACTIVE | Noted: 2022-08-30

## 2022-10-12 PROBLEM — D12.6 BENIGN NEOPLASM OF COLON, UNSPECIFIED: Chronic | Status: ACTIVE | Noted: 2022-08-30

## 2022-10-12 PROBLEM — Z87.19 PERSONAL HISTORY OF OTHER DISEASES OF THE DIGESTIVE SYSTEM: Chronic | Status: ACTIVE | Noted: 2022-08-30

## 2022-10-12 PROBLEM — Z87.891 PERSONAL HISTORY OF NICOTINE DEPENDENCE: Chronic | Status: ACTIVE | Noted: 2022-08-30

## 2022-10-12 PROBLEM — N28.9 DISORDER OF KIDNEY AND URETER, UNSPECIFIED: Chronic | Status: ACTIVE | Noted: 2022-08-30

## 2022-10-12 PROBLEM — I34.0 NONRHEUMATIC MITRAL (VALVE) INSUFFICIENCY: Chronic | Status: ACTIVE | Noted: 2022-08-30

## 2022-10-12 PROBLEM — I35.0 NONRHEUMATIC AORTIC (VALVE) STENOSIS: Chronic | Status: ACTIVE | Noted: 2022-08-30

## 2022-10-12 PROBLEM — Z86.39 PERSONAL HISTORY OF OTHER ENDOCRINE, NUTRITIONAL AND METABOLIC DISEASE: Chronic | Status: ACTIVE | Noted: 2022-08-30

## 2022-10-12 PROBLEM — M19.90 UNSPECIFIED OSTEOARTHRITIS, UNSPECIFIED SITE: Chronic | Status: ACTIVE | Noted: 2022-08-30

## 2022-10-25 ENCOUNTER — APPOINTMENT (OUTPATIENT)
Dept: ORTHOPEDIC SURGERY | Facility: CLINIC | Age: 82
End: 2022-10-25

## 2022-10-25 PROCEDURE — 99213 OFFICE O/P EST LOW 20 MIN: CPT | Mod: 25

## 2022-10-25 PROCEDURE — 20605 DRAIN/INJ JOINT/BURSA W/O US: CPT

## 2022-10-25 RX ORDER — LOSARTAN POTASSIUM 25 MG/1
25 TABLET, FILM COATED ORAL
Qty: 90 | Refills: 0 | Status: ACTIVE | COMMUNITY
Start: 2022-05-27

## 2022-10-25 RX ORDER — LOSARTAN POTASSIUM 100 MG/1
100 TABLET, FILM COATED ORAL
Qty: 90 | Refills: 0 | Status: ACTIVE | COMMUNITY
Start: 2022-10-13

## 2022-10-25 RX ORDER — APIXABAN 5 MG/1
5 TABLET, FILM COATED ORAL
Qty: 180 | Refills: 0 | Status: ACTIVE | COMMUNITY
Start: 2022-09-14

## 2022-10-25 RX ORDER — AMOXICILLIN 500 MG/1
500 CAPSULE ORAL
Qty: 21 | Refills: 0 | Status: ACTIVE | COMMUNITY
Start: 2022-09-12

## 2022-10-25 RX ORDER — MOXIFLOXACIN OPHTHALMIC 5 MG/ML
0.5 SOLUTION/ DROPS OPHTHALMIC
Qty: 3 | Refills: 0 | Status: ACTIVE | COMMUNITY
Start: 2022-06-07

## 2022-10-25 RX ORDER — AMIODARONE HYDROCHLORIDE 200 MG/1
200 TABLET ORAL
Qty: 90 | Refills: 0 | Status: ACTIVE | COMMUNITY
Start: 2022-06-16

## 2022-10-25 RX ORDER — BROMFENAC SODIUM 0.7 MG/ML
0.07 SOLUTION/ DROPS OPHTHALMIC
Qty: 3 | Refills: 0 | Status: ACTIVE | COMMUNITY
Start: 2022-10-21

## 2022-10-25 RX ORDER — ROSUVASTATIN CALCIUM 5 MG/1
5 TABLET, FILM COATED ORAL
Qty: 90 | Refills: 0 | Status: ACTIVE | COMMUNITY
Start: 2022-10-13

## 2022-10-25 RX ORDER — PREDNISOLONE ACETATE 10 MG/ML
1 SUSPENSION/ DROPS OPHTHALMIC
Qty: 5 | Refills: 0 | Status: ACTIVE | COMMUNITY
Start: 2022-08-22

## 2022-10-25 RX ORDER — NIRMATRELVIR AND RITONAVIR 300-100 MG
20 X 150 MG & KIT ORAL
Qty: 30 | Refills: 0 | Status: COMPLETED | COMMUNITY
Start: 2022-09-22

## 2022-10-25 RX ORDER — METOPROLOL SUCCINATE 50 MG/1
50 TABLET, EXTENDED RELEASE ORAL
Qty: 90 | Refills: 0 | Status: ACTIVE | COMMUNITY
Start: 2022-09-03

## 2022-10-25 RX ORDER — ACETAZOLAMIDE 500 MG/1
500 CAPSULE ORAL
Qty: 1 | Refills: 0 | Status: ACTIVE | COMMUNITY
Start: 2022-08-22

## 2022-10-25 NOTE — ADDENDUM
[FreeTextEntry1] : I, Aracely Santos wrote this note acting as a scribe for Dr. Pan Crain on Oct 25, 2022.

## 2022-10-25 NOTE — DISCUSSION/SUMMARY
[FreeTextEntry1] : The underlying pathophysiology was reviewed with the patient. Discussed at length the nature of the patient’s condition. 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 \par \par At this time, I am recommending repeat cortisone injections today into the CMC joint of both the right and left thumbs given the recurrence of his symptoms. I told him that he may ultimately require surgery consisting of basal joint arthroplasty. He asked me about having both sides operated on at once, which I strongly advised against. He stated he would likely then first proceed with the more symptomatic left side. He will be leaving for [place] next month and states that upon his return, he will return to the office to further discuss operative management.\par \par The patient wishes to proceed with a cortisone injection at this time. Under sterile precautions, an injection of 0.5 cc 1% lidocaine with 0.25 cc of Kenalog and 0.25 cc of Dexamethasone was administered into the RIGHT basal joint. The patient tolerated the procedure well. Rest and apply ice. \par \par The patient wishes to proceed with a cortisone injection at this time. Under sterile precautions, an injection of 0.5 cc 1% lidocaine with 0.25 cc of Kenalog and 0.25 cc of Dexamethasone was administered into the LEFT basal joint. The patient tolerated the procedure well. Rest and apply ice. \par \par Finally, with regard to the right knee, he deferred a xrays and further treatment today. He stated, he would like to be treated for this at his next visit when he returns and will schedule a separate appointment.\par \par All questions answered, understanding verbalized. Patient in agreement with plan of care. Follow up as needed.

## 2022-10-25 NOTE — HISTORY OF PRESENT ILLNESS
[FreeTextEntry1] : Patient is a 81 year old male who presents with complaints of bilateral hand pain which began years ago. 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 He was treated at that time with bilateral CMC joint injections. He had also previously in the past been treated with cortisone injections to the same area by Dr. Pritchard. He was treated on 4/12/22 with bilateral cortisone injections again. He would also like to discuss surgery but states he is not interested until next year.\par \par He has an additional complaint of right knee pain. He has not been treated for this nor has he had an xray. He deferred an xray in the office today and states next time he is in the office, he would like an xray.

## 2022-10-25 NOTE — PHYSICAL EXAM
[de-identified] : Patient is WDWN, alert, and in no acute distress. Breathing is unlabored. He is grossly oriented to person, place, and time.\par \par Right Hand:\par There is basal joint tenderness. Positive grind test.There is ecchymosis/vascular discoloration along dorsal aspect of thumb CMC joint that developed following prior injection.Full arc of motion in the fingers with pain on thumb ROM. All intrinsic and \par extrinsic hand muscles 5/5. No joint instability on provocative testing. Sensation is intact to light touch. \par \par Left Hand: \par There is basal joint tenderness. Full arc of motion in the fingers with pain on thumb ROM. Positive grind test.All intrinsic and \par extrinsic hand muscles 5/5. No joint instability on provocative testing. Sensation is intact to light touch. \par There are no skin lesions or discoloration.  [de-identified] : no imaging today

## 2022-10-25 NOTE — END OF VISIT
[FreeTextEntry3] : All medical record entries made by the Scribe were at my,  Dr. Pan Crain MD., direction and personally dictated by me on 10/25/2022. I have personally reviewed the chart and agree that the record accurately reflects my personal performance of the history, physical exam, assessment and plan.

## 2023-04-25 ENCOUNTER — APPOINTMENT (OUTPATIENT)
Dept: ORTHOPEDIC SURGERY | Facility: CLINIC | Age: 83
End: 2023-04-25
Payer: MEDICARE

## 2023-04-25 DIAGNOSIS — M18.11 UNILATERAL PRIMARY OSTEOARTHRITIS OF FIRST CARPOMETACARPAL JOINT, RIGHT HAND: ICD-10-CM

## 2023-04-25 DIAGNOSIS — M18.12 UNILATERAL PRIMARY OSTEOARTHRITIS OF FIRST CARPOMETACARPAL JOINT, LEFT HAND: ICD-10-CM

## 2023-04-25 PROCEDURE — 20600 DRAIN/INJ JOINT/BURSA W/O US: CPT | Mod: 50

## 2023-04-25 PROCEDURE — 99213 OFFICE O/P EST LOW 20 MIN: CPT | Mod: 25

## 2023-04-25 NOTE — ADDENDUM
[FreeTextEntry1] : I, Aracely Santos wrote this note acting as a scribe for Dr. Pan Crain on Apr 25, 2023.

## 2023-04-25 NOTE — PHYSICAL EXAM
[de-identified] : Patient is WDWN, alert, and in no acute distress. Breathing is unlabored. He is grossly oriented to person, place, and time.\par \par Right Hand:\par There is basal joint tenderness. Positive grind test. There is ecchymosis/vascular discoloration along dorsal aspect of thumb CMC joint that developed following prior injection.Full arc of motion in the fingers with pain on thumb ROM. All intrinsic and \par extrinsic hand muscles 5/5. No joint instability on provocative testing. Sensation is intact to light touch. \par \par Left Hand: \par There is basal joint tenderness. Full arc of motion in the fingers with pain on thumb ROM. Positive grind test.All intrinsic and \par extrinsic hand muscles 5/5. No joint instability on provocative testing. Sensation is intact to light touch. \par There are no skin lesions or discoloration.  [de-identified] : no imaging today

## 2023-04-25 NOTE — END OF VISIT
[FreeTextEntry3] : All medical record entries made by the Scribe were at my,  Dr. Pan Crain MD., direction and personally dictated by me on 04/25/2023. I have personally reviewed the chart and agree that the record accurately reflects my personal performance of the history, physical exam, assessment and plan.

## 2023-04-25 NOTE — DISCUSSION/SUMMARY
[FreeTextEntry1] : The underlying pathophysiology was reviewed with the patient. Discussed at length the nature of the patient’s condition. 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 \par \par At this time, I am recommending repeat cortisone injections today into the CMC joint of both the right and left thumbs given the recurrence of his symptoms. I told him that he may ultimately require surgery consisting of basal joint arthroplasty. \par \par The patient wishes to proceed with a cortisone injection at this time. Under sterile precautions, an injection of 0.5 cc 1% lidocaine with 0.25 cc of Kenalog and 0.25 cc of Dexamethasone was administered into the RIGHT basal joint. The patient tolerated the procedure well. Rest and apply ice. \par \par The patient wishes to proceed with a cortisone injection at this time. Under sterile precautions, an injection of 0.5 cc 1% lidocaine with 0.25 cc of Kenalog and 0.25 cc of Dexamethasone was administered into the LEFT basal joint. The patient tolerated the procedure well. Rest and apply ice. \par \par All questions answered, understanding verbalized. Patient in agreement with plan of care. Follow up as needed.

## 2023-04-25 NOTE — HISTORY OF PRESENT ILLNESS
[FreeTextEntry1] : Patient is a 82 year old male who presents with complaints of bilateral hand pain which began years ago. He was last treated for bilateral thumb CMC joint arthritis on 10/25/22. He was treated at that time with bilateral CMC joint injections, which he has also been treated with multiple times in the past. He returns on 4/25/23 and is complaining of recurrent pain at the right and left thumbs. He would like another cortisone injection today.

## 2023-05-16 ENCOUNTER — OUTPATIENT (OUTPATIENT)
Dept: OUTPATIENT SERVICES | Facility: HOSPITAL | Age: 83
LOS: 1 days | End: 2023-05-16
Payer: COMMERCIAL

## 2023-05-16 ENCOUNTER — APPOINTMENT (OUTPATIENT)
Dept: ULTRASOUND IMAGING | Facility: HOSPITAL | Age: 83
End: 2023-05-16
Payer: MEDICARE

## 2023-05-16 DIAGNOSIS — Z98.890 OTHER SPECIFIED POSTPROCEDURAL STATES: Chronic | ICD-10-CM

## 2023-05-16 DIAGNOSIS — Z00.8 ENCOUNTER FOR OTHER GENERAL EXAMINATION: ICD-10-CM

## 2023-05-16 DIAGNOSIS — Z98.49 CATARACT EXTRACTION STATUS, UNSPECIFIED EYE: Chronic | ICD-10-CM

## 2023-05-16 PROCEDURE — 93970 EXTREMITY STUDY: CPT | Mod: 26

## 2023-05-16 PROCEDURE — 93970 EXTREMITY STUDY: CPT

## 2023-09-15 ENCOUNTER — NON-APPOINTMENT (OUTPATIENT)
Age: 83
End: 2023-09-15

## 2023-09-15 DIAGNOSIS — Z80.49 FAMILY HISTORY OF MALIGNANT NEOPLASM OF OTHER GENITAL ORGANS: ICD-10-CM

## 2023-09-15 DIAGNOSIS — B35.3 TINEA PEDIS: ICD-10-CM

## 2023-09-15 DIAGNOSIS — Z86.018 PERSONAL HISTORY OF OTHER BENIGN NEOPLASM: ICD-10-CM

## 2023-09-15 DIAGNOSIS — E05.90 THYROTOXICOSIS, UNSPECIFIED W/OUT THYROTOXIC CRISIS OR STORM: ICD-10-CM

## 2023-09-15 DIAGNOSIS — K64.9 UNSPECIFIED HEMORRHOIDS: ICD-10-CM

## 2023-09-15 DIAGNOSIS — I44.0 ATRIOVENTRICULAR BLOCK, FIRST DEGREE: ICD-10-CM

## 2023-09-15 DIAGNOSIS — I10 ESSENTIAL (PRIMARY) HYPERTENSION: ICD-10-CM

## 2023-09-15 DIAGNOSIS — E78.5 HYPERLIPIDEMIA, UNSPECIFIED: ICD-10-CM

## 2023-09-15 DIAGNOSIS — Z87.891 PERSONAL HISTORY OF NICOTINE DEPENDENCE: ICD-10-CM

## 2023-09-15 DIAGNOSIS — N40.0 BENIGN PROSTATIC HYPERPLASIA WITHOUT LOWER URINARY TRACT SYMPMS: ICD-10-CM

## 2023-09-15 DIAGNOSIS — Z86.19 PERSONAL HISTORY OF OTHER INFECTIOUS AND PARASITIC DISEASES: ICD-10-CM

## 2023-11-06 NOTE — ASU PREOP CHECKLIST - WEIGHT IN LBS
----- Message from DIANNE Rubio sent at 11/6/2023  6:48 AM CST -----  Call patient tell labs normal except bilirubin elevated continued elevation of liver function testing.  I would like to get him checked out.  Please do ultrasound abdomen to look at liver.  I would also like to refer him to GI for evaluation of elevated liver function testing.  Tell him no worries but I just want to get him checked out.  Please place the orders for ultrasound and to see GI.   169

## 2024-06-20 NOTE — ED PROVIDER NOTE - WOUND TYPE
[facility] FOLLOW UP NOTE  HEMATOLOGY AND ONCOLOGY    ASSESSMENT AND PLAN   In summary, 55 year old female who presents for follow-up visit regarding:    IMPRESSION/PLAN:  Anorectal squamous cell carcinoma, at least cT4N1.   Status post concurrent chemotherapy RT with 5 FU and mitomycin 5/2023  Malignant melanoma of the right breast TxN0 M1 status post mastectomy April 2018. BRAF negative.  Nivolumab May 2018 - February 2019, discontinued after 16 cycles due to immunotherapy related rash.  Leukopenia  Thrombocytopenia  Folic acid deficiency  Cirrhosis on CT  Intermittent dizziness    Discussed clinical presentation, labs, imaging and overall management with patient.   Discussed NCCN guidelines.  Discussed overall management options.  Encouraged follow-up with colorectal surgery for ongoing screening.  In view of patient's history of malignant melanoma of right breast and rectal squamous cell carcinoma we will plan for CT chest abdomen pelvis with contrast prior to next visit.  In addition strongly recommended mammogram for which patient has been noncompliant.  Patient close verbalized and is agreeable  With regards to intermittent dizziness and history of aneurysm we will follow-up MRI brain and consult neurology.  Encourage patient to follow-up with PCP  Reviewed labs showing overall stability.  For CBC CMP folic acid levels prior to next visit  Patient to go to ED with any concerning issues prior to next visit.  Patient is currently asymptomatic today in clinic  Continue folic acid supplement 1 mg daily  Discussed healthy diet and lifestyle.  Discussed age appropriate cancer screening.  Follow-up in 4 months or sooner if needed.    Discussed with patient the clinical presentation, natural history, course and prognosis of illness.    Overall management and therapeutic options discussed and explained.  Side effects, risks and benefits, and alternatives discussed.  Patient acknowledges understanding of  disease and agrees with treatment plan.    All questions were answered satisfactorily. Patient is instructed to contact us should issue arise prior to her next scheduled appointment. I spent time on this case reviewing documentation, interpreting results, updating H&P, face to face time and coordinating care.    Thank you for letting me participate in the care of this patient.    Remington Monk, DO [facility] [place]   [facility]   HEMATOLOGY/ONCOLOGY SUMMARY  Malignant melanoma of the right breast TxN0 M1 status post mastectomy April 2018  BRAF negative  Nivolumab  therapy since May 2018 - February 2019, discontinued after 16 cycles due to immunotherapy related rash.  CANCER STAGING  Oncology History   Melanoma of female breast  (CMD)   4/4/2018 Initial Diagnosis    Melanoma of female breast (CMS/HCC)     3/6/2023 -  Chemotherapy    [No matching medication found in this treatment plan]     Cancer of anorectal junction  (CMD)   2/16/2023 Initial Diagnosis    Cancer of anorectal junction (CMS/HCC)     3/6/2023 -  Chemotherapy    [No matching medication found in this treatment plan]         CHIEF COMPLAINT  Chief Complaint   Patient presents with    Office Visit     Cancer of anorectal junction        INTERVAL HISTORY  Etelvina Kenney presents today for follow-up visit.  Patient states she is doing okay overall.  She reports intermittent dizziness with standing and ambulation over the past 8 months.  Patient states symptoms occur sporadically and randomly.  They also resolve randomly.  Patient does have a history of brain aneurysm and has not been compliant with follow-ups.  Patient denies chest pain, shortness of breath, fevers, chills, nausea, vomiting, abdominal pain, blood in urine or stool, focal neurological deficits, B symptoms, masses/adenopathy.    REVIEW OF SYSTEMS:  All other systems are reviewed and are negative except as documented in the interval  LACERATION history as above.    CURRENT MEDICATIONS  Current Outpatient Medications   Medication Sig    simvastatin (ZOCOR) 40 MG tablet Take 1 tablet by mouth nightly.    lisinopril (ZESTRIL) 20 MG tablet Take 1 tablet by mouth daily.    carvedilol (COREG) 6.25 MG tablet TAKE 1 Tablet BY MOUTH EVERY MORNING AND TAKE 1 Tablet BY MOUTH 1 TIME IN THE EVENING with meals    fluoxetine (PROzac) 40 MG capsule Take 2 capsules by mouth daily.    busPIRone (BUSPAR) 5 MG tablet Take 1 tablet by mouth in the morning and 1 tablet in the evening.    buprenorphine-naLOXone (SUBOXONE FILM) 12-3 MG sublingual film Place a HALF strip under the tongue 4 times daily.    amphetamine-dextroamphetamine (Adderall) 20 MG tablet Take 1 tablet by mouth 3 times daily. Begin taking on May 25, 2024.    prednisoLONE acetate (PRED FORTE) 1 % ophthalmic suspension Instill 1 drop into surgical eye 2 times daily for 1 week then 1 time daily for 1 week starting left eye 4/15/2024. Use in left eye only. (Patient not taking: Reported on 6/20/2024)    tiZANidine (ZANAFLEX) 4 MG tablet Take 1 tablet by mouth every 8 hours as needed (muscle spasms).    moxifloxacin (VIGAMOX) 0.5 % ophthalmic solution Place 1 drop into left eye 4 times daily. (Patient not taking: Reported on 6/20/2024)    Neomycin-Polymyxin-Dexameth 0.1 % Ointment Apply 1 application. topically in the morning and 1 application. in the evening. Once at bedtime and once at dinner tonight. (Patient not taking: Reported on 6/20/2024)    Vitamin D, Ergocalciferol, 1.25 mg (50,000 units) capsule Take 1 capsule by mouth 2 days a week.    LORazepam (Ativan) 0.5 MG tablet Take 1 tablet by mouth one time daily as needed for anxiety/nausea. (Patient not taking: Reported on 6/20/2024)    ketorolac (ACULAR) 0.5 % ophthalmic solution Place 1 drop 4 times daily to the surgical eye starting 4/12/2024 left eye and 5/10/2024 right eye. Instill for a total of 17 days in each eye. DO NOT use longer than 17 days.  Prolonged use can cause eye problems. (Patient not taking: Reported on 5/30/2024)    sucralfate (CARAFATE) 1 g tablet Use the following: Crush 2 Grams (2 Tablets) of Carafate and mix with 4.5 ml of luke warm tap water. Draw up with syringe and insert syringe using lubricant into rectum twice daily: in the morning and in the evening for one month. (Patient not taking: Reported on 6/20/2024)    sucralfate (CARAFATE) 1 GM/10ML suspension Use the following: put 30 ml of liquid Carafate into vaginal douche container, insert vaginally once daily for one month. (Patient not taking: Reported on 6/20/2024)    folic acid (FOLATE) 1 MG tablet Take 1 tablet by mouth daily.    Insulin Lispro, 1 Unit Dial, (HumaLOG KwikPen) 100 UNIT/ML pen-injector Inject 30 Units into the skin in the morning and 30 Units at noon and 30 Units in the evening. Inject before meals. plus sliding scale 2:50>150. Max 80 units daily. Prime 2 units before each dose.    insulin degludec (Tresiba FlexTouch) 200 UNIT/ML pen-injector Inject 20 Units into the skin daily. Prime 2 units before each dose.    naproxen sodium (ALEVE) 220 MG tablet Take 220 mg by mouth daily as needed.    dulaglutide (Trulicity) 4.5 MG/0.5ML pen-injector Inject 4.5 mg into the skin every 7 days. Indications: Type 2 Diabetes    nystatin (MYCOSTATIN) 323632 UNIT/GM cream Apply topically 2 times daily.    nystatin (MYCOSTATIN) 187865 UNIT/GM powder Apply topically 3 times daily.    Cholecalciferol (vitamin D3) 125 mcg (5,000 units) capsule Take 1 capsule by mouth daily. In addition to weekly presription strength.    Cyanocobalamin (Vitamin B-12) 2500 MCG SL Tab Place 5,000 mcg under the tongue daily.    omeprazole (PriLOSEC) 40 MG capsule TAKE 1 CAPSULE BY MOUTH TWICE DAILY    loperamide (Imodium A-D) 2 MG capsule Take 1 capsule by mouth 4 times daily as needed for Diarrhea.    prochlorperazine (COMPAZINE) 10 MG tablet Take 1 tablet by mouth every 6 hours as needed for Nausea or  Vomiting. (Patient not taking: Reported on 6/20/2024)    blood glucose test strip Test blood sugar 4 times daily. Diagnosis: Diabetes Mellitus. Meter: One Touch    blood glucose lancets Test blood sugar 4 times daily. Diagnosis: Diabetes Mellitus. Meter: One Touch    ibuprofen (MOTRIN) 400 MG tablet Take 1 tablet by mouth every 6 hours as needed for Pain.     No current facility-administered medications for this visit.     Facility-Administered Medications Ordered in Other Visits   Medication    sodium chloride 0.9 % injector flush 100 mL       PAST MEDICAL HISTORY  Past Medical History:   Diagnosis Date    Arthritis     Attention deficit disorder     Bronchitis     Carpal tunnel syndrome 2/10/2006    Central line clotted, initial encounter (CMD) 4/25/2023    Cerebral infarction  (CMD) 06/25/2016    no residual     Colon polyps 2/6/2023    COLONOSCOPY DONE 2/6/2023, REVEALED: Normal mucosa in the entire colon and distal 15 cm of terminal ileum.   Suboptimal prep in the entire colon.  Thick liquid stool noted in the entire colon.  Small lesions could have been missed.   10 mm descending colon polyp.  Hot snare polypectomy done.   5 mm sigmoid colon polyp.  Hot snare polypectomy done.   Moderate left-sided diverticulosis.   4-5 cm rec    COPD (chronic obstructive pulmonary disease)  (CMD)     Dental decay 10/13/2022    Depression     Diabetes mellitus  (CMD)     Type 2- checks blood sugars 3 times a day    Dyslipidemia     Eczema     Esophageal varices in cirrhosis  (CMD)     Essential (primary) hypertension     Liver cirrhosis  (CMD) 10/08/2021    Macular hole of left eye     Melanoma  (CMD)     right breast    Myopia with presbyopia 8/29/2016    TIKA (obstructive sleep apnea)     not using CPAP    PHT (portal hypertension)  (CMD) 10/08/2021    Pulmonary embolism and infarction 01/29/2011    Pulmonary embolism, bilateral  (CMD) 01/28/2011            SOCIAL HISTORY  Social History     Tobacco Use    Smoking status:  Every Day     Current packs/day: 0.75     Average packs/day: 0.8 packs/day for 30.0 years (22.5 ttl pk-yrs)     Types: Cigarettes    Smokeless tobacco: Never    Tobacco comments:     Smokes 1-1.5 ppd   Vaping Use    Vaping status: never used   Substance Use Topics    Alcohol use: Yes     Comment: occasionally 0-1 every couple of months    Drug use: No       FAMILY HISTORY  Family History   Problem Relation Age of Onset    Hypertension Mother     Osteoarthritis Mother     Crohn's Disease Father     Migraine Father     Substance Abuse Father         pills    Cataracts Father     Diabetes Sister     Osteoarthritis Sister     COPD Sister     Patient is unaware of any medical problems Sister     Diabetes Maternal Grandmother     Substance Abuse Daughter     Patient is unaware of any medical problems Daughter        OBJECTIVES   PHYSICAL EXAMINATION  Oncology Encounter Vitals [06/20/24 1300]   ONC OP Encounter Vitals Group      BP (!) 145/82      Heart Rate 96      Resp 16      Temp 97.7 °F (36.5 °C)      Temp src Temporal      SpO2       Weight 196 lb 9.6 oz (89.2 kg)      Height 5' 3\" (1.6 m)      Pain Score  0      Pain Location       Pain Education?       BSA (Calculated - m2) - [employer] 1.92      BSA (Calculated - sq m) 1.99      BMI (Calculated) 34.83       ECOG Performance Status   ECOG   ECOG Performance Status     0-1        GEN:  NAD  Eyes::  Normal eye movements. No scleral icterus or conjunctival pallor.    ENT:  Oropharynx reveals no exudate, thrush or ulcers or hemorrhages.    Neck:  Trachea midline.    Respiratory:  Normal chest expansion noted.  Auscultation reveals normal breath sounds bilaterally.   Cardiovascular:  Normal rhythm and rate.    Breast exam: pt deferred   Abdomen:  Soft, nontender.    Neurologic:  No Acute focal neurological deficits.   Moving all extremities spontaneously.   Skin:  No rashes, lesions, ulcers petechiae or hemorrhages on visualized areas.  Bilateral upper and lower  extremities:  No clubbing, cyanosis or edema.  Lymphatics: No supraclavicular, cervical, axillary lymphadenopathy     LABORATORY DATA  No results found for this visit on 06/20/24.    Lab Results   Component Value Date    WBC 3.5 (L) 06/20/2024    HGB 13.8 06/20/2024     (L) 06/20/2024    MCV 94.3 06/20/2024    Lab Results   Component Value Date    SODIUM 136 04/12/2024    POTASSIUM 4.5 04/12/2024    CHLORIDE 102 04/12/2024    CO2 30 04/12/2024    BUN 8 04/12/2024    CREATININE 0.65 04/12/2024    GLUCOSE 394 (H) 04/12/2024      Lab Results   Component Value Date     07/22/2022    Lab Results   Component Value Date    AST 22 04/12/2024    GPT 28 04/12/2024    ALKPT 113 04/12/2024    BILIRUBIN 0.5 04/12/2024    TOTPROTEIN 7.2 04/12/2024    ALBUMIN 4.0 04/12/2024

## 2024-09-16 ENCOUNTER — OUTPATIENT (OUTPATIENT)
Dept: OUTPATIENT SERVICES | Facility: HOSPITAL | Age: 84
LOS: 1 days | End: 2024-09-16
Payer: COMMERCIAL

## 2024-09-16 ENCOUNTER — APPOINTMENT (OUTPATIENT)
Dept: CT IMAGING | Facility: HOSPITAL | Age: 84
End: 2024-09-16
Payer: MEDICARE

## 2024-09-16 DIAGNOSIS — D64.9 ANEMIA, UNSPECIFIED: ICD-10-CM

## 2024-09-16 DIAGNOSIS — Z98.890 OTHER SPECIFIED POSTPROCEDURAL STATES: Chronic | ICD-10-CM

## 2024-09-16 DIAGNOSIS — Z98.49 CATARACT EXTRACTION STATUS, UNSPECIFIED EYE: Chronic | ICD-10-CM

## 2024-09-16 DIAGNOSIS — R63.4 ABNORMAL WEIGHT LOSS: ICD-10-CM

## 2024-09-16 PROCEDURE — 74160 CT ABDOMEN W/CONTRAST: CPT | Mod: 26

## 2024-09-16 PROCEDURE — 74160 CT ABDOMEN W/CONTRAST: CPT

## 2024-09-16 PROCEDURE — 71250 CT THORAX DX C-: CPT

## 2024-09-16 PROCEDURE — 71250 CT THORAX DX C-: CPT | Mod: 26

## 2025-04-27 ENCOUNTER — INPATIENT (INPATIENT)
Facility: HOSPITAL | Age: 85
LOS: 0 days | Discharge: HOME | DRG: 185 | End: 2025-04-28
Attending: STUDENT IN AN ORGANIZED HEALTH CARE EDUCATION/TRAINING PROGRAM | Admitting: STUDENT IN AN ORGANIZED HEALTH CARE EDUCATION/TRAINING PROGRAM
Payer: COMMERCIAL

## 2025-04-27 ENCOUNTER — EMERGENCY (EMERGENCY)
Facility: HOSPITAL | Age: 85
LOS: 1 days | End: 2025-04-27
Attending: STUDENT IN AN ORGANIZED HEALTH CARE EDUCATION/TRAINING PROGRAM | Admitting: STUDENT IN AN ORGANIZED HEALTH CARE EDUCATION/TRAINING PROGRAM
Payer: COMMERCIAL

## 2025-04-27 VITALS
HEART RATE: 64 BPM | SYSTOLIC BLOOD PRESSURE: 162 MMHG | OXYGEN SATURATION: 98 % | RESPIRATION RATE: 17 BRPM | TEMPERATURE: 97 F | DIASTOLIC BLOOD PRESSURE: 66 MMHG | HEIGHT: 65 IN | WEIGHT: 160.06 LBS

## 2025-04-27 VITALS
OXYGEN SATURATION: 99 % | SYSTOLIC BLOOD PRESSURE: 190 MMHG | DIASTOLIC BLOOD PRESSURE: 65 MMHG | TEMPERATURE: 97 F | RESPIRATION RATE: 14 BRPM | HEART RATE: 59 BPM

## 2025-04-27 VITALS
DIASTOLIC BLOOD PRESSURE: 63 MMHG | OXYGEN SATURATION: 97 % | SYSTOLIC BLOOD PRESSURE: 176 MMHG | WEIGHT: 153 LBS | TEMPERATURE: 98 F | RESPIRATION RATE: 20 BRPM | HEIGHT: 65 IN | HEART RATE: 58 BPM

## 2025-04-27 DIAGNOSIS — Z98.49 CATARACT EXTRACTION STATUS, UNSPECIFIED EYE: Chronic | ICD-10-CM

## 2025-04-27 DIAGNOSIS — Z98.890 OTHER SPECIFIED POSTPROCEDURAL STATES: Chronic | ICD-10-CM

## 2025-04-27 DIAGNOSIS — S22.49XA MULTIPLE FRACTURES OF RIBS, UNSPECIFIED SIDE, INITIAL ENCOUNTER FOR CLOSED FRACTURE: ICD-10-CM

## 2025-04-27 LAB
ADD ON TEST-SPECIMEN IN LAB: SIGNIFICANT CHANGE UP
ALBUMIN SERPL ELPH-MCNC: 3.2 G/DL — LOW (ref 3.3–5)
ALBUMIN SERPL ELPH-MCNC: 3.6 G/DL — SIGNIFICANT CHANGE UP (ref 3.3–5)
ALBUMIN SERPL ELPH-MCNC: 3.8 G/DL — SIGNIFICANT CHANGE UP (ref 3.3–5)
ALP SERPL-CCNC: 42 U/L — SIGNIFICANT CHANGE UP (ref 40–120)
ALP SERPL-CCNC: 44 U/L — SIGNIFICANT CHANGE UP (ref 40–120)
ALP SERPL-CCNC: 53 U/L — SIGNIFICANT CHANGE UP (ref 40–120)
ALT FLD-CCNC: 12 U/L — SIGNIFICANT CHANGE UP (ref 10–45)
ALT FLD-CCNC: 12 U/L — SIGNIFICANT CHANGE UP (ref 10–45)
ALT FLD-CCNC: 23 U/L — SIGNIFICANT CHANGE UP (ref 10–45)
ANION GAP SERPL CALC-SCNC: 3 MMOL/L — LOW (ref 5–17)
ANION GAP SERPL CALC-SCNC: 7 MMOL/L — SIGNIFICANT CHANGE UP (ref 5–17)
ANION GAP SERPL CALC-SCNC: 9 MMOL/L — SIGNIFICANT CHANGE UP (ref 5–17)
APTT BLD: 29.5 SEC — SIGNIFICANT CHANGE UP (ref 26.1–36.8)
APTT BLD: 30.8 SEC — SIGNIFICANT CHANGE UP (ref 26.1–36.8)
AST SERPL-CCNC: 18 U/L — SIGNIFICANT CHANGE UP (ref 10–40)
AST SERPL-CCNC: 20 U/L — SIGNIFICANT CHANGE UP (ref 10–40)
AST SERPL-CCNC: 22 U/L — SIGNIFICANT CHANGE UP (ref 10–40)
BASOPHILS # BLD AUTO: 0.01 K/UL — SIGNIFICANT CHANGE UP (ref 0–0.2)
BASOPHILS # BLD AUTO: 0.02 K/UL — SIGNIFICANT CHANGE UP (ref 0–0.2)
BASOPHILS NFR BLD AUTO: 0.2 % — SIGNIFICANT CHANGE UP (ref 0–2)
BASOPHILS NFR BLD AUTO: 0.3 % — SIGNIFICANT CHANGE UP (ref 0–2)
BILIRUB SERPL-MCNC: 0.3 MG/DL — SIGNIFICANT CHANGE UP (ref 0.2–1.2)
BILIRUB SERPL-MCNC: 0.3 MG/DL — SIGNIFICANT CHANGE UP (ref 0.2–1.2)
BILIRUB SERPL-MCNC: 0.4 MG/DL — SIGNIFICANT CHANGE UP (ref 0.2–1.2)
BLD GP AB SCN SERPL QL: NEGATIVE — SIGNIFICANT CHANGE UP
BUN SERPL-MCNC: 11 MG/DL — SIGNIFICANT CHANGE UP (ref 7–23)
BUN SERPL-MCNC: 13 MG/DL — SIGNIFICANT CHANGE UP (ref 7–23)
BUN SERPL-MCNC: 15 MG/DL — SIGNIFICANT CHANGE UP (ref 7–23)
CALCIUM SERPL-MCNC: 8.6 MG/DL — SIGNIFICANT CHANGE UP (ref 8.4–10.5)
CHLORIDE SERPL-SCNC: 107 MMOL/L — SIGNIFICANT CHANGE UP (ref 96–108)
CHLORIDE SERPL-SCNC: 108 MMOL/L — SIGNIFICANT CHANGE UP (ref 96–108)
CHLORIDE SERPL-SCNC: 111 MMOL/L — HIGH (ref 96–108)
CO2 SERPL-SCNC: 23 MMOL/L — SIGNIFICANT CHANGE UP (ref 22–31)
CO2 SERPL-SCNC: 23 MMOL/L — SIGNIFICANT CHANGE UP (ref 22–31)
CO2 SERPL-SCNC: 28 MMOL/L — SIGNIFICANT CHANGE UP (ref 22–31)
CREAT SERPL-MCNC: 0.91 MG/DL — SIGNIFICANT CHANGE UP (ref 0.5–1.3)
CREAT SERPL-MCNC: 0.93 MG/DL — SIGNIFICANT CHANGE UP (ref 0.5–1.3)
CREAT SERPL-MCNC: 1.16 MG/DL — SIGNIFICANT CHANGE UP (ref 0.5–1.3)
EGFR: 62 ML/MIN/1.73M2 — SIGNIFICANT CHANGE UP
EGFR: 62 ML/MIN/1.73M2 — SIGNIFICANT CHANGE UP
EGFR: 81 ML/MIN/1.73M2 — SIGNIFICANT CHANGE UP
EGFR: 81 ML/MIN/1.73M2 — SIGNIFICANT CHANGE UP
EGFR: 84 ML/MIN/1.73M2 — SIGNIFICANT CHANGE UP
EGFR: 84 ML/MIN/1.73M2 — SIGNIFICANT CHANGE UP
EOSINOPHIL # BLD AUTO: 0.07 K/UL — SIGNIFICANT CHANGE UP (ref 0–0.5)
EOSINOPHIL # BLD AUTO: 0.08 K/UL — SIGNIFICANT CHANGE UP (ref 0–0.5)
EOSINOPHIL NFR BLD AUTO: 1.1 % — SIGNIFICANT CHANGE UP (ref 0–6)
EOSINOPHIL NFR BLD AUTO: 1.3 % — SIGNIFICANT CHANGE UP (ref 0–6)
GAS PNL BLDV: SIGNIFICANT CHANGE UP
GLUCOSE SERPL-MCNC: 105 MG/DL — HIGH (ref 70–99)
GLUCOSE SERPL-MCNC: 115 MG/DL — HIGH (ref 70–99)
GLUCOSE SERPL-MCNC: 116 MG/DL — HIGH (ref 70–99)
HCT VFR BLD CALC: 34.2 % — LOW (ref 39–50)
HCT VFR BLD CALC: 35.4 % — LOW (ref 39–50)
HCT VFR BLD CALC: 35.5 % — LOW (ref 39–50)
HGB BLD-MCNC: 11.4 G/DL — LOW (ref 13–17)
HGB BLD-MCNC: 11.6 G/DL — LOW (ref 13–17)
HGB BLD-MCNC: 12 G/DL — LOW (ref 13–17)
IMM GRANULOCYTES NFR BLD AUTO: 0.2 % — SIGNIFICANT CHANGE UP (ref 0–0.9)
IMM GRANULOCYTES NFR BLD AUTO: 0.3 % — SIGNIFICANT CHANGE UP (ref 0–0.9)
INR BLD: 0.99 RATIO — SIGNIFICANT CHANGE UP (ref 0.85–1.16)
INR BLD: 1.17 RATIO — HIGH (ref 0.85–1.16)
LIDOCAIN IGE QN: 32 U/L — SIGNIFICANT CHANGE UP (ref 7–60)
LYMPHOCYTES # BLD AUTO: 1.82 K/UL — SIGNIFICANT CHANGE UP (ref 1–3.3)
LYMPHOCYTES # BLD AUTO: 1.84 K/UL — SIGNIFICANT CHANGE UP (ref 1–3.3)
LYMPHOCYTES # BLD AUTO: 28.7 % — SIGNIFICANT CHANGE UP (ref 13–44)
LYMPHOCYTES # BLD AUTO: 29.2 % — SIGNIFICANT CHANGE UP (ref 13–44)
MAGNESIUM SERPL-MCNC: 2.1 MG/DL — SIGNIFICANT CHANGE UP (ref 1.6–2.6)
MAGNESIUM SERPL-MCNC: 2.3 MG/DL — SIGNIFICANT CHANGE UP (ref 1.6–2.6)
MCHC RBC-ENTMCNC: 32.8 G/DL — SIGNIFICANT CHANGE UP (ref 32–36)
MCHC RBC-ENTMCNC: 33.3 G/DL — SIGNIFICANT CHANGE UP (ref 32–36)
MCHC RBC-ENTMCNC: 33.4 PG — SIGNIFICANT CHANGE UP (ref 27–34)
MCHC RBC-ENTMCNC: 33.4 PG — SIGNIFICANT CHANGE UP (ref 27–34)
MCHC RBC-ENTMCNC: 33.6 PG — SIGNIFICANT CHANGE UP (ref 27–34)
MCHC RBC-ENTMCNC: 33.8 G/DL — SIGNIFICANT CHANGE UP (ref 32–36)
MCV RBC AUTO: 100.3 FL — HIGH (ref 80–100)
MCV RBC AUTO: 102 FL — HIGH (ref 80–100)
MCV RBC AUTO: 99.4 FL — SIGNIFICANT CHANGE UP (ref 80–100)
MONOCYTES # BLD AUTO: 0.45 K/UL — SIGNIFICANT CHANGE UP (ref 0–0.9)
MONOCYTES # BLD AUTO: 0.69 K/UL — SIGNIFICANT CHANGE UP (ref 0–0.9)
MONOCYTES NFR BLD AUTO: 10.7 % — SIGNIFICANT CHANGE UP (ref 2–14)
MONOCYTES NFR BLD AUTO: 7.2 % — SIGNIFICANT CHANGE UP (ref 2–14)
NEUTROPHILS # BLD AUTO: 3.79 K/UL — SIGNIFICANT CHANGE UP (ref 1.8–7.4)
NEUTROPHILS # BLD AUTO: 3.86 K/UL — SIGNIFICANT CHANGE UP (ref 1.8–7.4)
NEUTROPHILS NFR BLD AUTO: 59 % — SIGNIFICANT CHANGE UP (ref 43–77)
NEUTROPHILS NFR BLD AUTO: 61.8 % — SIGNIFICANT CHANGE UP (ref 43–77)
NRBC BLD AUTO-RTO: 0 /100 WBCS — SIGNIFICANT CHANGE UP (ref 0–0)
PHOSPHATE SERPL-MCNC: 2.6 MG/DL — SIGNIFICANT CHANGE UP (ref 2.5–4.5)
PHOSPHATE SERPL-MCNC: 2.7 MG/DL — SIGNIFICANT CHANGE UP (ref 2.5–4.5)
PLATELET # BLD AUTO: 160 K/UL — SIGNIFICANT CHANGE UP (ref 150–400)
PLATELET # BLD AUTO: 168 K/UL — SIGNIFICANT CHANGE UP (ref 150–400)
PLATELET # BLD AUTO: 169 K/UL — SIGNIFICANT CHANGE UP (ref 150–400)
POTASSIUM SERPL-MCNC: 3.9 MMOL/L — SIGNIFICANT CHANGE UP (ref 3.5–5.3)
POTASSIUM SERPL-MCNC: 4 MMOL/L — SIGNIFICANT CHANGE UP (ref 3.5–5.3)
POTASSIUM SERPL-MCNC: 4.7 MMOL/L — SIGNIFICANT CHANGE UP (ref 3.5–5.3)
POTASSIUM SERPL-SCNC: 3.9 MMOL/L — SIGNIFICANT CHANGE UP (ref 3.5–5.3)
POTASSIUM SERPL-SCNC: 4 MMOL/L — SIGNIFICANT CHANGE UP (ref 3.5–5.3)
POTASSIUM SERPL-SCNC: 4.7 MMOL/L — SIGNIFICANT CHANGE UP (ref 3.5–5.3)
PROT SERPL-MCNC: 6.4 G/DL — SIGNIFICANT CHANGE UP (ref 6–8.3)
PROT SERPL-MCNC: 6.8 G/DL — SIGNIFICANT CHANGE UP (ref 6–8.3)
PROT SERPL-MCNC: 7.1 G/DL — SIGNIFICANT CHANGE UP (ref 6–8.3)
PROTHROM AB SERPL-ACNC: 11.4 SEC — SIGNIFICANT CHANGE UP (ref 9.9–13.4)
PROTHROM AB SERPL-ACNC: 13.4 SEC — SIGNIFICANT CHANGE UP (ref 9.9–13.4)
RBC # BLD: 3.41 M/UL — LOW (ref 4.2–5.8)
RBC # BLD: 3.47 M/UL — LOW (ref 4.2–5.8)
RBC # BLD: 3.57 M/UL — LOW (ref 4.2–5.8)
RBC # FLD: 14.3 % — SIGNIFICANT CHANGE UP (ref 10.3–14.5)
RBC # FLD: 14.3 % — SIGNIFICANT CHANGE UP (ref 10.3–14.5)
RBC # FLD: 14.4 % — SIGNIFICANT CHANGE UP (ref 10.3–14.5)
RH IG SCN BLD-IMP: POSITIVE — SIGNIFICANT CHANGE UP
RH IG SCN BLD-IMP: POSITIVE — SIGNIFICANT CHANGE UP
SODIUM SERPL-SCNC: 138 MMOL/L — SIGNIFICANT CHANGE UP (ref 135–145)
SODIUM SERPL-SCNC: 140 MMOL/L — SIGNIFICANT CHANGE UP (ref 135–145)
SODIUM SERPL-SCNC: 141 MMOL/L — SIGNIFICANT CHANGE UP (ref 135–145)
TROPONIN I, HIGH SENSITIVITY RESULT: 20.4 NG/L — SIGNIFICANT CHANGE UP
WBC # BLD: 6.19 K/UL — SIGNIFICANT CHANGE UP (ref 3.8–10.5)
WBC # BLD: 6.24 K/UL — SIGNIFICANT CHANGE UP (ref 3.8–10.5)
WBC # BLD: 6.42 K/UL — SIGNIFICANT CHANGE UP (ref 3.8–10.5)
WBC # FLD AUTO: 6.19 K/UL — SIGNIFICANT CHANGE UP (ref 3.8–10.5)
WBC # FLD AUTO: 6.24 K/UL — SIGNIFICANT CHANGE UP (ref 3.8–10.5)
WBC # FLD AUTO: 6.42 K/UL — SIGNIFICANT CHANGE UP (ref 3.8–10.5)

## 2025-04-27 PROCEDURE — 74176 CT ABD & PELVIS W/O CONTRAST: CPT | Mod: MC

## 2025-04-27 PROCEDURE — 72125 CT NECK SPINE W/O DYE: CPT | Mod: 26

## 2025-04-27 PROCEDURE — 93010 ELECTROCARDIOGRAM REPORT: CPT

## 2025-04-27 PROCEDURE — 99285 EMERGENCY DEPT VISIT HI MDM: CPT

## 2025-04-27 PROCEDURE — 99222 1ST HOSP IP/OBS MODERATE 55: CPT

## 2025-04-27 PROCEDURE — 84484 ASSAY OF TROPONIN QUANT: CPT

## 2025-04-27 PROCEDURE — 70450 CT HEAD/BRAIN W/O DYE: CPT | Mod: 26

## 2025-04-27 PROCEDURE — 96361 HYDRATE IV INFUSION ADD-ON: CPT

## 2025-04-27 PROCEDURE — 74176 CT ABD & PELVIS W/O CONTRAST: CPT | Mod: 26

## 2025-04-27 PROCEDURE — 99291 CRITICAL CARE FIRST HOUR: CPT | Mod: 25

## 2025-04-27 PROCEDURE — 85025 COMPLETE CBC W/AUTO DIFF WBC: CPT

## 2025-04-27 PROCEDURE — 71250 CT THORAX DX C-: CPT | Mod: 26

## 2025-04-27 PROCEDURE — 70450 CT HEAD/BRAIN W/O DYE: CPT | Mod: MC

## 2025-04-27 PROCEDURE — 80053 COMPREHEN METABOLIC PANEL: CPT

## 2025-04-27 PROCEDURE — 71045 X-RAY EXAM CHEST 1 VIEW: CPT | Mod: 26

## 2025-04-27 PROCEDURE — 99291 CRITICAL CARE FIRST HOUR: CPT | Mod: GC

## 2025-04-27 PROCEDURE — 96374 THER/PROPH/DIAG INJ IV PUSH: CPT

## 2025-04-27 PROCEDURE — 36415 COLL VENOUS BLD VENIPUNCTURE: CPT

## 2025-04-27 PROCEDURE — 93005 ELECTROCARDIOGRAM TRACING: CPT

## 2025-04-27 PROCEDURE — 72125 CT NECK SPINE W/O DYE: CPT | Mod: MC

## 2025-04-27 PROCEDURE — 71250 CT THORAX DX C-: CPT | Mod: MC

## 2025-04-27 PROCEDURE — 99291 CRITICAL CARE FIRST HOUR: CPT

## 2025-04-27 RX ORDER — OXYCODONE HYDROCHLORIDE 30 MG/1
5 TABLET ORAL
Refills: 0 | Status: DISCONTINUED | OUTPATIENT
Start: 2025-04-27 | End: 2025-04-28

## 2025-04-27 RX ORDER — LIDOCAINE HYDROCHLORIDE 20 MG/ML
1 JELLY TOPICAL EVERY 24 HOURS
Refills: 0 | Status: DISCONTINUED | OUTPATIENT
Start: 2025-04-28 | End: 2025-04-28

## 2025-04-27 RX ORDER — POLYETHYLENE GLYCOL 3350 17 G/17G
17 POWDER, FOR SOLUTION ORAL EVERY 12 HOURS
Refills: 0 | Status: DISCONTINUED | OUTPATIENT
Start: 2025-04-27 | End: 2025-04-28

## 2025-04-27 RX ORDER — SODIUM PHOSPHATE,DIBASIC DIHYD
15 POWDER (GRAM) MISCELLANEOUS ONCE
Refills: 0 | Status: COMPLETED | OUTPATIENT
Start: 2025-04-27 | End: 2025-04-27

## 2025-04-27 RX ORDER — LIDOCAINE HYDROCHLORIDE 20 MG/ML
1 JELLY TOPICAL EVERY 24 HOURS
Refills: 0 | Status: DISCONTINUED | OUTPATIENT
Start: 2025-04-27 | End: 2025-04-27

## 2025-04-27 RX ORDER — OXYCODONE HYDROCHLORIDE 30 MG/1
2.5 TABLET ORAL
Refills: 0 | Status: DISCONTINUED | OUTPATIENT
Start: 2025-04-27 | End: 2025-04-28

## 2025-04-27 RX ORDER — ACETAMINOPHEN 500 MG/5ML
1000 LIQUID (ML) ORAL ONCE
Refills: 0 | Status: COMPLETED | OUTPATIENT
Start: 2025-04-27 | End: 2025-04-27

## 2025-04-27 RX ORDER — ACETAMINOPHEN 500 MG/5ML
1000 LIQUID (ML) ORAL EVERY 6 HOURS
Refills: 0 | Status: COMPLETED | OUTPATIENT
Start: 2025-04-27 | End: 2025-04-28

## 2025-04-27 RX ORDER — HYDROMORPHONE/SOD CHLOR,ISO/PF 2 MG/10 ML
0.25 SYRINGE (ML) INJECTION ONCE
Refills: 0 | Status: DISCONTINUED | OUTPATIENT
Start: 2025-04-27 | End: 2025-04-27

## 2025-04-27 RX ORDER — SENNA 187 MG
2 TABLET ORAL AT BEDTIME
Refills: 0 | Status: DISCONTINUED | OUTPATIENT
Start: 2025-04-27 | End: 2025-04-28

## 2025-04-27 RX ORDER — ENOXAPARIN SODIUM 100 MG/ML
40 INJECTION SUBCUTANEOUS EVERY 24 HOURS
Refills: 0 | Status: DISCONTINUED | OUTPATIENT
Start: 2025-04-28 | End: 2025-04-28

## 2025-04-27 RX ORDER — LIDOCAINE HYDROCHLORIDE 20 MG/ML
1 JELLY TOPICAL ONCE
Refills: 0 | Status: COMPLETED | OUTPATIENT
Start: 2025-04-27 | End: 2025-04-27

## 2025-04-27 RX ADMIN — Medication 255 MILLIMOLE(S): at 23:00

## 2025-04-27 RX ADMIN — Medication 4 MILLIGRAM(S): at 11:50

## 2025-04-27 RX ADMIN — Medication 500 MILLILITER(S): at 11:45

## 2025-04-27 RX ADMIN — Medication 500 MILLILITER(S): at 12:45

## 2025-04-27 RX ADMIN — Medication 400 MILLIGRAM(S): at 15:21

## 2025-04-27 RX ADMIN — LIDOCAINE HYDROCHLORIDE 1 PATCH: 20 JELLY TOPICAL at 19:00

## 2025-04-27 RX ADMIN — Medication 1000 MILLIGRAM(S): at 22:30

## 2025-04-27 RX ADMIN — LIDOCAINE HYDROCHLORIDE 1 PATCH: 20 JELLY TOPICAL at 15:20

## 2025-04-27 RX ADMIN — Medication 2 TABLET(S): at 22:03

## 2025-04-27 RX ADMIN — OXYCODONE HYDROCHLORIDE 2.5 MILLIGRAM(S): 30 TABLET ORAL at 22:03

## 2025-04-27 RX ADMIN — Medication 0.25 MILLIGRAM(S): at 18:25

## 2025-04-27 RX ADMIN — OXYCODONE HYDROCHLORIDE 5 MILLIGRAM(S): 30 TABLET ORAL at 19:27

## 2025-04-27 RX ADMIN — OXYCODONE HYDROCHLORIDE 5 MILLIGRAM(S): 30 TABLET ORAL at 19:57

## 2025-04-27 RX ADMIN — OXYCODONE HYDROCHLORIDE 2.5 MILLIGRAM(S): 30 TABLET ORAL at 22:33

## 2025-04-27 RX ADMIN — Medication 0.25 MILLIGRAM(S): at 18:40

## 2025-04-27 RX ADMIN — Medication 400 MILLIGRAM(S): at 22:03

## 2025-04-27 RX ADMIN — Medication 4 MILLIGRAM(S): at 10:57

## 2025-04-27 RX ADMIN — Medication 1 APPLICATION(S): at 22:03

## 2025-04-27 NOTE — ED ADULT NURSE NOTE - OBJECTIVE STATEMENT
Pt presents to ED from home s/p fall yesterday. Pt states he slipped and fell down stairs in his empty pool. + head strike, on eliquis. Pt c/o left sided back, flank pain. Denies LOC.

## 2025-04-27 NOTE — CONSULT NOTE ADULT - ATTENDING COMMENTS
84 yr A afib on axpiaban w multi rib fx      aaox4 , multi modal pain control   on RA, encourage IS  AM CXR  hemodynamically stable  reg diet  on bowel regimen   voiding, adequate urine output  reviewed electrolytes  lovenox ppx  home apixaban per report

## 2025-04-27 NOTE — CONSULT NOTE ADULT - SUBJECTIVE AND OBJECTIVE BOX
SICU Consultation Note  =====================================================  HPI: 84M PMH Afib (Eliquis, last dose 4/27 AM) presenting as a trauma transfer from [place] after sustaining fall in the empty pool yesterday trying to clean and sustained a fall after slipping. Reports hitting back of his head but no LOC. Denies shortness of breath, but has right sided chest wall pain. On afebrile, hemodynamically stable. Saturating 98% on 2L NC. Underwent CTH, c-spine, CAP at [place]. Imaging significant for left 5-11 rib fractures with segmental components at the 6-7th ribs, small left-sided pleural effusion with partial atelectasis/airspace, trace right-sided pleural effusion, no pneumothorax. SICU consulted for rib fx.     PAST MEDICAL & SURGICAL HISTORY:  Hypertension, unspecified type      Borderline diabetic      Atrial fibrillation  ablation in 10/21      Former cigarette smoker      Aortic stenosis      Tubular adenoma of colon      H/O hyperthyroidism      H/O hemorrhoids      History of first degree AV block      BPH (benign prostatic hyperplasia)      Bilateral cataracts      DJD (degenerative joint disease)      Sciatica      History of aortic insufficiency      Renal insufficiency      Mitral regurgitation      S/P ablation of atrial flutter      H/O cataract extraction  right eye      History of arthroscopic surgery of shoulder  both        Home Meds: Home Medications:  Eliquis 5 mg oral tablet: 1 tab(s) orally 2 times a day (31 Aug 2022 08:35)  losartan 100 mg oral tablet: 1 tab(s) orally once a day (31 Aug 2022 08:35)  metoprolol succinate 50 mg oral tablet, extended release: 1 tab(s) orally once a day (31 Aug 2022 08:35)  rosuvastatin 5 mg oral tablet: 1 tab(s) orally once a day (31 Aug 2022 08:35)    Allergies: Allergies    amiodarone (Swelling)    Intolerances      Soc:   Advanced Directives: Presumed Full Code     ROS:    REVIEW OF SYSTEMS    [ ] A ten-point review of systems was otherwise negative except as noted.  [ ] Due to altered mental status/intubation, subjective information were not able to be obtained from the patient. History was obtained, to the extent possible, from review of the chart and collateral sources of information.      CURRENT MEDICATIONS:   --------------------------------------------------------------------------------------  Neurologic Medications    Respiratory Medications    Cardiovascular Medications    Gastrointestinal Medications    Genitourinary Medications    Hematologic/Oncologic Medications    Antimicrobial/Immunologic Medications    Endocrine/Metabolic Medications    Topical/Other Medications    --------------------------------------------------------------------------------------    VITAL SIGNS, INS/OUTS (last 24 hours):  --------------------------------------------------------------------------------------  ICU Vital Signs Last 24 Hrs  T(C): 36.5 (27 Apr 2025 15:22), Max: 36.5 (27 Apr 2025 15:22)  T(F): 97.7 (27 Apr 2025 15:22), Max: 97.7 (27 Apr 2025 15:22)  HR: 59 (27 Apr 2025 15:22) (58 - 64)  BP: 153/86 (27 Apr 2025 15:22) (153/86 - 190/65)  BP(mean): --  ABP: --  ABP(mean): --  RR: 19 (27 Apr 2025 15:22) (14 - 20)  SpO2: 98% (27 Apr 2025 15:22) (93% - 99%)    O2 Parameters below as of 27 Apr 2025 15:22  Patient On (Oxygen Delivery Method): nasal cannula  O2 Flow (L/min): 2        I&O's Summary    --------------------------------------------------------------------------------------    EXAM:  General/Neuro  Exam: Normal, NAD, alert, oriented     Respiratory  Exam: Lungs clear to auscultation, Normal expansion/effort. 1500 IS.     Cardiovascular  Exam: S1, S2.  Regular rate and rhythm.  Peripheral edema     GI  Exam: Abdomen soft, Non-tender, Non-distended.     Extremities  Exam: Extremities warm, pink, well-perfused.      Derm:  Exam: Good skin turgor, no skin breakdown.          LABS  --------------------------------------------------------------------------------------  Labs:  CAPILLARY BLOOD GLUCOSE                              11.6   6.24  )-----------( 169      ( 27 Apr 2025 14:52 )             35.4       Auto Immature Granulocyte %: 0.2 % (04-27-25 @ 14:52)  Auto Immature Granulocyte %: 0.3 % (04-27-25 @ 10:55)    04-27    141  |  111[H]  |  13  ----------------------------<  116[H]  4.0   |  23  |  0.91      Calcium: 8.6 mg/dL (04-27-25 @ 14:52)      LFTs:             6.8  | 0.3  | 20       ------------------[44      ( 27 Apr 2025 14:52 )  3.8  | x    | 12          Lipase:32     Amylase:x             Coags:     13.4   ----< 1.17    ( 27 Apr 2025 14:52 )     30.8                Urinalysis Basic - ( 27 Apr 2025 14:52 )    Color: x / Appearance: x / SG: x / pH: x  Gluc: 116 mg/dL / Ketone: x  / Bili: x / Urobili: x   Blood: x / Protein: x / Nitrite: x   Leuk Esterase: x / RBC: x / WBC x   Sq Epi: x / Non Sq Epi: x / Bacteria: x

## 2025-04-27 NOTE — ED PROVIDER NOTE - OBJECTIVE STATEMENT
83 yo male with hx of HTN, paAib on Eliquis presenting with wife for severe L sided back pain after a fall yesterday morning. Reports he was in their empty pool yesterday morning and slipped on the slopped area. He fell on his back and hit the back of his head. No LOC. Suffured mild head pain, now resolved, but still with 10/10 L sided back pain. He took tylenol 1000mg and 2 tabs of his wife's oxycodone yesterday. Also took 2 tabs tylenol  Denies nausea, vomiting, incontinence, fevers, chills diarrhea. 83 yo male with hx of HTN, paAib on Eliquis presenting with wife for severe L sided mid- back pain after a fall yesterday morning. Reports he was in their empty pool yesterday morning and slipped on the slopped area. He fell on his back and hit the back of his head. No LOC. Suffered mild head pain, now resolved, but still with 10/10 L sided back pain radiating to his left chest. It is hard for him to put on his shirts because of the pain. He took tylenol 1000mg and 2 tabs of his wife's oxycodone yesterday with minimal relief. Also took 2 tabs Tylenol this morning Denies nausea, vomiting, incontinence, fevers, chills diarrhea. No extremity or spinal pain. No difficultly with ambulation.

## 2025-04-27 NOTE — PATIENT PROFILE ADULT - FALL HARM RISK - HARM RISK INTERVENTIONS

## 2025-04-27 NOTE — H&P ADULT - NSHPPHYSICALEXAM_GEN_ALL_CORE
VITAL SIGNS:  Vital Signs Last 24 Hrs  T(C): 36.5 (27 Apr 2025 15:22), Max: 36.5 (27 Apr 2025 15:22)  T(F): 97.7 (27 Apr 2025 15:22), Max: 97.7 (27 Apr 2025 15:22)  HR: 59 (27 Apr 2025 15:22) (58 - 64)  BP: 153/86 (27 Apr 2025 15:22) (153/86 - 190/65)  BP(mean): --  RR: 19 (27 Apr 2025 15:22) (14 - 20)  SpO2: 98% (27 Apr 2025 15:22) (93% - 99%)    Parameters below as of 27 Apr 2025 15:22  Patient On (Oxygen Delivery Method): nasal cannula  O2 Flow (L/min): 2        PHYSICAL EXAM:    General: NAD, Sitting in bed comfortably  HEENT: NC/AT  Neck: Soft, supple  Cardio: RRR  Resp: Good effort, no increased work of breathing, saturating 98% on 2L NC, pulling 1500 on IS  Thorax: Left-sided chest wall tenderness  Vascular: Extremities warm  Skin: Intact, no breakdown  Musculoskeletal: All 4 extremities moving spontaneously, no limitations

## 2025-04-27 NOTE — ED ADULT NURSE NOTE - OBJECTIVE STATEMENT
84 year old male pt presented to the ED as transfer from [place] for left sided rib fracture, pt is ambulatory A&Ox3 had a slip and fall yesterday in an empty pool landing on his left side, pt denies LOC states landing on left side, pt on eliquis for afib, vss pt demonstrates use of incentive spirometer abd soft tender to left side pain on movement

## 2025-04-27 NOTE — ED PROVIDER NOTE - CLINICAL SUMMARY MEDICAL DECISION MAKING FREE TEXT BOX
85 yo male with hx of HTN, pAfib on Eliquis presenting with wife for severe L sided mid- back pain after a fall yesterday morning. Reports he was in their empty pool yesterday morning and slipped on the slopped area. He fell on his back and hit the back of his head. No LOC. Suffered mild head pain, now resolved, but still with 10/10 L sided back pain radiating to his left chest. It is hard for him to put on his shirts because of the pain. He took tylenol 1000mg and 2 tabs of his wife's oxycodone yesterday with minimal relief. Also took 2 tabs Tylenol this morning Denies nausea, vomiting, incontinence, fevers, chills diarrhea. No extremity or spinal pain. No difficultly with ambulation.    Exam as above   Differential includes muscle spasm, rib fracture, r/o dissection. Will do CT pan-scan to rule out fracture. Pain control. Will do cardiac workup for L chest pain, although likely related to L sided back pain. 85 yo male with hx of HTN, pAfib on Eliquis presenting with wife for severe L sided mid- back pain after a fall yesterday morning. Reports he was in their empty pool yesterday morning and slipped on the slopped area. He fell on his back and hit the back of his head. No LOC. Suffered mild head pain, now resolved, but still with 10/10 L sided back pain radiating to his left chest. It is hard for him to put on his shirts because of the pain. He took tylenol 1000mg and 2 tabs of his wife's oxycodone yesterday with minimal relief. Also took 2 tabs Tylenol this morning Denies nausea, vomiting, incontinence, fevers, chills diarrhea. No extremity or spinal pain. No difficultly with ambulation.    Exam as above   Differential includes muscle spasm, rib fracture, r/o dissection. Will do CT pan-scan to rule out fracture. Pain control. Will do cardiac workup for L chest pain, although likely related to L sided back pain.    Kenney Van MD, EM/Medical Toxicology/Addiction Medicine Attending   84m pmhx of afib on eliquis, htn presenting with left upper back pain and head trauma s/p mechanical slip and fall occurring yesterday.  Was in a empty pool and slipped, with landing on the left upper back and w/ head strike. NO LOC or syncope. Was able to get up on his own after a few minutes. Ambulatory with pain from his LEFT upper back.   Otherwise: denies any chest pain, abdominal pain, shortness of breath, nausea/vomiting,       hip pain, other extremity injury,   lightheadedness,    visual complaints, rashes, fevers/chills, difficulty ambulating, abrasions, lacerations, subjective neurological deficits, numbness/tingling.     DDX includes but is not limited to: ICH, intracranial traumatic injuries, intraabdominal trauma, intrathoracic trauma, hip/pelvic fracture, extremity fracture, traumatic neck injuries, abrasions, lacerations, contusions, acs atypical.  - I have a low suspicion for serious traumatic injuries based on the physical exam and history.  PLAN:  - Obtain pan-ct for rule out rib fracture/thoracic fx/scapular fx.  - No prolonged downtime, does not require cpk.  - chest pain likely secondary to trauma, winces on physical exam when palpating the left upper chest lateral wall; Possible rib fx or contusion.  - Pain control PRN, cardiac monitor, ekg, cbc/cmp, trop x 1 to rule out ACS.  - 85 yo male with hx of HTN, pAfib on Eliquis presenting with wife for severe L sided mid- back pain after a fall yesterday morning. Reports he was in their empty pool yesterday morning and slipped on the slopped area. He fell on his back and hit the back of his head. No LOC. Suffered mild head pain, now resolved, but still with 10/10 L sided back pain radiating to his left chest. It is hard for him to put on his shirts because of the pain. He took tylenol 1000mg and 2 tabs of his wife's oxycodone yesterday with minimal relief. Also took 2 tabs Tylenol this morning Denies nausea, vomiting, incontinence, fevers, chills diarrhea. No extremity or spinal pain. No difficultly with ambulation.    Exam as above   Differential includes muscle spasm, rib fracture, r/o dissection. Will do CT pan-scan to rule out fracture. Pain control. Will do cardiac workup for L chest pain, although likely related to L sided back pain.    Kenney Van MD, EM/Medical Toxicology/Addiction Medicine Attending   84m pmhx of afib on eliquis, htn presenting with left upper back pain and head trauma s/p mechanical slip and fall occurring yesterday.  Was in a empty pool and slipped, with landing on the left upper back and w/ head strike. NO LOC or syncope. Was able to get up on his own after a few minutes. Ambulatory with pain from his LEFT upper back.   Otherwise: denies any chest pain, abdominal pain, shortness of breath, nausea/vomiting,       hip pain, other extremity injury,   lightheadedness,    visual complaints, rashes, fevers/chills, difficulty ambulating, abrasions, lacerations, subjective neurological deficits, numbness/tingling.     DDX includes but is not limited to: ICH, intracranial traumatic injuries, intraabdominal trauma, intrathoracic trauma, hip/pelvic fracture, extremity fracture, traumatic neck injuries, abrasions, lacerations, contusions, acs atypical.  - I have a low suspicion for serious traumatic injuries based on the physical exam and history.  PLAN:  - Obtain pan-ct for rule out rib fracture/thoracic fx/scapular fx.  - No prolonged downtime, does not require cpk.  - chest pain likely secondary to trauma, winces on physical exam when palpating the left upper chest lateral wall; Possible rib fx or contusion.  - Pain control PRN, cardiac monitor, ekg, cbc/cmp, trop x 1 to rule out ACS.    1225PM: CT reviewed; 7 rib fx of the left side, saturating 93% on RA, placed on 2L NC, 99%. Otherwise pain controlled with morphine 4mg x 1. Trop x 1 negative. Case discussed with Trauma surgery at [facility], recommend transfer to ED and for evaluation. 83 yo male with hx of HTN, pAfib on Eliquis presenting with wife for severe L sided mid- back pain after a fall yesterday morning. Reports he was in their empty pool yesterday morning and slipped on the slopped area. He fell on his back and hit the back of his head. No LOC. Suffered mild head pain, now resolved, but still with 10/10 L sided back pain radiating to his left chest. It is hard for him to put on his shirts because of the pain. He took tylenol 1000mg and 2 tabs of his wife's oxycodone yesterday with minimal relief. Also took 2 tabs Tylenol this morning Denies nausea, vomiting, incontinence, fevers, chills diarrhea. No extremity or spinal pain. No difficultly with ambulation.    Exam as above   Differential includes muscle spasm, rib fracture, r/o dissection. Will do CT pan-scan to rule out fracture. Pain control. Will do cardiac workup for L chest pain, although likely related to L sided back pain.    Kenney Van MD, EM/Medical Toxicology/Addiction Medicine Attending   84m pmhx of afib on eliquis, htn presenting with left upper back pain and head trauma s/p mechanical slip and fall occurring yesterday.  Was in a empty pool and slipped, with landing on the left upper back and w/ head strike. NO LOC or syncope. Was able to get up on his own after a few minutes. Ambulatory with pain from his LEFT upper back.   Otherwise: denies any chest pain, abdominal pain, shortness of breath, nausea/vomiting,       hip pain, other extremity injury,   lightheadedness,    visual complaints, rashes, fevers/chills, difficulty ambulating, abrasions, lacerations, subjective neurological deficits, numbness/tingling.     DDX includes but is not limited to: ICH, intracranial traumatic injuries, intraabdominal trauma, intrathoracic trauma, hip/pelvic fracture, extremity fracture, traumatic neck injuries, abrasions, lacerations, contusions, acs atypical.  - I have a low suspicion for serious traumatic injuries based on the physical exam and history.  PLAN:  - Obtain pan-ct for rule out rib fracture/thoracic fx/scapular fx.  - No prolonged downtime, does not require cpk.  - chest pain likely secondary to trauma, winces on physical exam when palpating the left upper chest lateral wall; Possible rib fx or contusion.  - Pain control PRN, cardiac monitor, ekg, cbc/cmp, trop x 1 to rule out ACS.    1225PM: CT reviewed; 7 rib fx of the left side, saturating 93% on RA, placed on 2L NC, 99%. Otherwise pain controlled with morphine 4mg x 1. Trop x 1 negative. Case discussed with Trauma surgery at [facility], recommend transfer to ED and for evaluation.   - Rib score reviewed; Ribscore of 7, A RibScore of =4 corresponds to greater risk of pneumonia, respiratory failure, and tracheostomy.

## 2025-04-27 NOTE — ED PROVIDER NOTE - ATTENDING APP SHARED VISIT CONTRIBUTION OF CARE
Attending MD Sanjuanita Arellano:  I personally made/approved the management plan and take responsibility for the patient management.  Physician assistant note reviewed and agree on plan of care and except where noted.  See HPI, PE, and MDM for details.

## 2025-04-27 NOTE — ED PROVIDER NOTE - CLINICAL SUMMARY MEDICAL DECISION MAKING FREE TEXT BOX
Attending Sanjuanita Arellano: 84-year-old male transferred from outside hospital after fall.  Patient was poorly cleaning the pool and slipped and fell onto the left side.  Did hit his head.  Complaining of pain to the left side of his body.  Took Tylenol at home.  Patient went to outside hospital was having difficulty breathing with concern for left-sided chest wall pain.  Blood work obtained as well as CT scans of the chest abdomen pelvis and head and neck.  CT showed evidence of multiple left-sided rib fractures rib 5, 6, 7, 8, 9, 10, 11.  Also showed evidence of small left pleural effusion and trace right-sided pleural effusion.  Patient is on Eliquis and Lasix today.  Upon arrival patient is awake and alert following commands.  GCS 15.  Primary survey is intact.  Patient denies any abdominal pain.  Patient received morphine prior to arrival.  With concern for multiple rib fractures will do multimodal pain control discussed with trauma and likely admit.

## 2025-04-27 NOTE — ED PROVIDER NOTE - PHYSICAL EXAMINATION
GENERAL: A&Ox3, non-toxic appearing, no acute distress  HEENT: NCAT, EOMI, oral mucosa moist, pink conjunctiva  RESP: CTABL  CV: RRR, +aortic systolic murmur   ABDOMEN: soft, non-tender, non-distended, no guarding, no CVA tenderness  MSK: 5/5 strength all 4 extremities, no hip pain,  L sided lateral posterior thoracic pain, no spinal tenderness or stepoffs  NEURO: no focal sensory or motor deficits, CN 2-12 grossly intact  SKIN: warm, normal color, well perfused, no rash, no bruising   PSYCH: normal affect GENERAL: A&Ox3, non-toxic appearing, no acute distress  HEENT: NCAT, EOMI, oral mucosa moist, pink conjunctiva  RESP: CTABL  CV: RRR, +aortic systolic murmur   ABDOMEN: soft, non-tender, non-distended, no guarding, no CVA tenderness  MSK: 5/5 strength all 4 extremities, no hip pain,  L sided lateral posterior thoracic pain, no spinal tenderness or stepoffs  NEURO: no focal sensory or motor deficits, CN 2-12 grossly intact  SKIN: warm, normal color, well perfused, no rash, no bruising   PSYCH: normal affect    Kenney Van MD, EM/Medical Toxicology/Addiction Medicine Attending   VITAL SIGNS: I have reviewed nursing notes and confirm.   GEN: Well-developed; well-nourished; in no acute distress. Speaking full sentences. GCS 15  SKIN: Warm, pink, no rash, no diaphoresis, no cyanosis, well perfused.   HEAD: Normocephalic; atraumatic. No scalp lacerations, no abrasions.  NECK: Supple; non tender.  NO midline ttp,  NO step offs,  Full ROM w/o pain.  EYES: Pupils 3mm equal, round, reactive to light and accomodation, conjunctiva and sclera clear. Extra-ocular movements intact bilaterally.  ENT: No nasal discharge; airway clear. Trachea is midline. ORAL: No oropharyngeal exudates or erythema. Normal dentition.  CV: RRR. S1, S2 normal; no murmurs, gallops, or rubs. Capillary refill < 2 seconds throughout. Distal pulses intact 2+ throughout.  (+) LEFT LATERAL upper chest wall TTP mild. winces to palpation.  RESP: CTA bilaterally. No wheezes, rales, or rhonchi. NO tachypnea. No retractions.  ABD: Normal bowel sounds, soft, non-distended, non-tender, no rebound, no guarding, no rigidity   MSK: Normal range of motion and movement of all 4 extremities. No joint or muscular pain throughout. No clubbing.    BACK:  NO thoracolumbar midline TTP,  (+) LEFT UPPER scapula/LEFT UPPER parathoracic TTP,  NO paralumbar TTP. No step-offs or obvious deformities. HIP: STABLE. NO TTP.   NEURO: Alert & oriented x 3, Grossly unremarkable. Sensory and motor intact throughout. No focal deficits.  Normal speech and coordination.

## 2025-04-27 NOTE — ED ADULT NURSE NOTE - NSICDXPASTSURGICALHX_GEN_ALL_CORE_FT
PAST SURGICAL HISTORY:  H/O cataract extraction right eye    History of arthroscopic surgery of shoulder both    S/P ablation of atrial flutter

## 2025-04-27 NOTE — ED ADULT NURSE NOTE - NSFALLLASTDATE_ED_ALL_ED_DT
Please offer patient appointment with Anmol Lee DO using double book any post op or follow up slot not already double booked on Monday July/24/2023, [place] location.           26-Apr-2025 14:38

## 2025-04-27 NOTE — H&P ADULT - NSHPLABSRESULTS_GEN_ALL_CORE
LABS:                        11.6   6.24  )-----------( 169      ( 27 Apr 2025 14:52 )             35.4     27 Apr 2025 14:52    141    |  111    |  13     ----------------------------<  116    4.0     |  23     |  0.91     Ca    8.6        27 Apr 2025 14:52    TPro  6.8    /  Alb  3.8    /  TBili  0.3    /  DBili  x      /  AST  20     /  ALT  12     /  AlkPhos  44     27 Apr 2025 14:52    PT/INR - ( 27 Apr 2025 14:52 )   PT: 13.4 sec;   INR: 1.17 ratio         PTT - ( 27 Apr 2025 14:52 )  PTT:30.8 sec  CAPILLARY BLOOD GLUCOSE            LIVER FUNCTIONS - ( 27 Apr 2025 14:52 )  Alb: 3.8 g/dL / Pro: 6.8 g/dL / ALK PHOS: 44 U/L / ALT: 12 U/L / AST: 20 U/L / GGT: x             Urinalysis Basic - ( 27 Apr 2025 14:52 )    Color: x / Appearance: x / SG: x / pH: x  Gluc: 116 mg/dL / Ketone: x  / Bili: x / Urobili: x   Blood: x / Protein: x / Nitrite: x   Leuk Esterase: x / RBC: x / WBC x   Sq Epi: x / Non Sq Epi: x / Bacteria: x      < from: CT Abdomen and Pelvis No Cont (04.27.25 @ 10:59) >    IMPRESSION:    Fractures left 5th through 11th ribs with segmental components at the 6   and 7th ribs.    Small left-sided pleural effusion with partial atelectasis/airspace   consolidation left lower lobe.  No pneumothorax.    Trace right-sided pleural effusion with probable subpleural atelectasis.    No acute visceral organ injury noted given limitations of noncontrast   exam.    Other findings as discussed above.    --- End of Report ---    < end of copied text >    < from: CT Cervical Spine No Cont (04.27.25 @ 10:50) >      IMPRESSION:  CT HEAD: No acute abnormality. Chronic changes as above.  CT CERVICAL SPINE: No acute abnormality. Chronic changes as above.    --- End of Report ---      < end of copied text >

## 2025-04-27 NOTE — H&P ADULT - ASSESSMENT
84M PMH Afib (Chrissyquis, last dose 4/27 AM) presenting as a trauma transfer from [place] after sustaining fall in the empty pool yesterday trying to clean and sustained a fall after slipping. Reports hitting back of his head but no LOC. On 2L NC, saturating 98%.     Injuries:  - Left 5-11 rib fractures with segmental components at the 6-7th ribs    [5th-lateral aspect, minimally displaced  6th-posterior lateral aspect, displaced and anterolateral aspect, nondisplaced.  7th-posterior aspect, nondisplaced and lateral aspect, nondisplaced.  8th-posterior aspect, mildly displaced.  9th-posterior aspect, nondisplaced.  10th-posterior aspect, displaced.  11th-nondisplaced]    - Small left-sided pleural effusion with partial atelectasis/airspace, trace right-sided pleural effusion    Rib score = 2    PLAN:  - Admit to Trauma under Dr. Duarte  - SICU for close monitoring  - Repeat CXR in AM  - Regular diet  - Incentive spirometry  - Multimodal pain control  - PT consult    Seen and examined with Dr. Duarte    Trauma -[id number]

## 2025-04-27 NOTE — ED PROVIDER NOTE - ATTENDING CONTRIBUTION TO CARE
Dr. MURIEL Van MD: I performed a face to face bedside interview with patient regarding history of present illness, review of symptoms and past medical history. I completed an independent physical exam.  I have discussed patient's plan of care with resident. I agree with note as stated above, having amended the EMR as needed to reflect my findings.   This includes HISTORY OF PRESENT ILLNESS, HIV, PAST MEDICAL/SURGICAL/FAMILY/SOCIAL HISTORY, ALLERGIES AND HOME MEDICATIONS, REVIEW OF SYSTEMS, PHYSICAL EXAM, and any PROGRESS NOTES during the time I functioned as the attending physician for this patient.    I personally made/approved the management plan and take responsibility for the patient management. Yes I independently interpreted the following studies I have seen and examined this patient and fully participated in the care of this patient as the teaching attending. I personally made/approved the management plan and take responsibility for the patient management.    Upon my evaluation, this patient had a high probability of imminent or lifethreatening deterioration due to 7 rib fractures, which required my direct attention, intervention, and personal management.     I have personally provided 31 minutes of critical care time exclusive of time spent on separately billable procedures. Time includes review of laboratory data, radiology results, discussion with consultants, and monitoring for potential decompensation. Interventions were performed as documented above.    - Kenney Van MD, Emergency Medicine and Medical Toxicology Attending.

## 2025-04-27 NOTE — ED PROVIDER NOTE - PHYSICAL EXAMINATION
Attending Sanjuanita Arellano: Gen: NAD, heent: atrauamtic, eomi, perrla, mmm, op pink, uvula midline, neck; nttp, no nuchal rigidity, chest: left chest wall ttp, no crepitus, cv: rrr, +murmur, lungs: ctab, abd: soft, nontender, nondistended, no peritoneal signs, +BS, no guarding, ext: wwp, neg homans, skin: no rash, neuro: awake and alert, following commands, speech clear, sensation and strength intact, no focal deficits

## 2025-04-27 NOTE — H&P ADULT - HISTORY OF PRESENT ILLNESS
84M PMH Afib (Eliquis, last dose 4/27 AM) presenting as a trauma transfer from [place] after sustaining fall in the empty pool yesterday trying to clean and sustained a fall after slipping. Reports hitting back of his head but no LOC. Denies shortness of breath, but has right sided chest wall pain.    On afebrile, hemodynamically stable. Saturating 98% on 2L NC.  Underwent CTH, c-spine, CAP at [place]  Imaging significant for left 5-11 rib fractures with segmental components at the 6-7th ribs, small left-sided pleural effusion with partial atelectasis/airspace, trace right-sided pleural effusion, no pneumothorax

## 2025-04-27 NOTE — ED ADULT NURSE NOTE - NSFALLRISKINTERV_ED_ALL_ED

## 2025-04-27 NOTE — H&P ADULT - ATTENDING COMMENTS
I have seen and examined this patient in the Emergency Department.     84 year old man with a history of AFib on Eliquis, who presented to an outside hospital today after falling in his swimming pool yesterday. He was reportedly cleaning his empty pool and had a mechanical fall. He presented complaining of pleuritic left chest pain and was found to have left 5th-11th rib fractures.     On arrival, he was on 2L NC with O2 sat 97%, interactive and speaking in full sentences. 1.5L on incentive spirometry.     CT and CXR imaging personally reviewed.    Admit to SICU given advanced age and number of rib fractures.  Multimodal pain control.  Continue incentive spirometry.   AM CXR.  Hold Eliquis for now.   Regular diet.   May need PT consult, but can at least mobilize out of bed to chair.       Leah Duarte M.D.  Department of Trauma, Acute Care Surgery and Surgical Critical Care

## 2025-04-27 NOTE — CONSULT NOTE ADULT - ASSESSMENT
ASSESSMENT: 84M PMH Afib (Eliquis, last dose 4/27 AM) presenting as a trauma transfer from [place] after sustaining fall in the empty pool yesterday trying to clean and sustained a fall after slipping. Reports hitting back of his head but no LOC. Denies shortness of breath, but has right sided chest wall pain. On afebrile, hemodynamically stable. Saturating 98% on 2L NC. Underwent CTH, c-spine, CAP at [place]. Imaging significant for left 5-11 rib fractures with segmental components at the 6-7th ribs, small left-sided pleural effusion with partial atelectasis/airspace, trace right-sided pleural effusion, no pneumothorax. SICU consulted for rib fx.     PLAN:   Neurologic:   - Alert and oriented   - Oxy, tylenol, lido patch for pain control     Respiratory:   - Nasal cannula   - Pulmonary toilet, IS, out of bed     Cardiovascular:   - Hemodynamically stable   - No vasopressors     Gastrointestinal/Nutrition:   - Regular diet   - Miralax/senna     Renal/Genitourinary:   - Monitor urine output   - Correct electrolytes     Hematologic:   - Monitor H/h   - Lovenox for DVT proph     Infectious Disease:   - Monitor for signs of infection     Endocrine:  - Monitor sugars     Lines/Tubes:   - PIV     Disposition:   - SICU

## 2025-04-27 NOTE — ED PROVIDER NOTE - NS ED ROS FT
GENERAL: no fever, no chills, no weight loss  EYES: no change in vision, no irritation, no discharge, no redness, no pain  HEENT: no trouble swallowing or speaking, no throat pain, no ear pain  CARDIAC: +chest pain, no palpitations   PULMONARY: no cough, SOB, wheezing, DUMONT  GI: no abdominal pain, no nausea, no vomiting, no diarrhea, no constipation, no melena, no hematochezia, no hematemesis  : no changes in urination, no dysuria, no frequency, no hematuria, no discharge  SKIN: no rashes  NEURO: no headache, no numbness, no weakness  MSK: +thoracic back pain, no joint pain, no muscle pain,  no calf pain

## 2025-04-27 NOTE — ED ADULT NURSE NOTE - NSHOSCREENINGQ1_ED_ALL_ED
I called patient RE the referral for weight loss. Patient could not talk at this time, was going to work. I will call patient Tuesday June 16th @ 9:00am.     I will go over options.     [facility]  [facility]  
I left patient a message to call me back RE referral for weight loss.   
No

## 2025-04-27 NOTE — ED PROVIDER NOTE - PROGRESS NOTE DETAILS
Attending Sanjuanita Arellano: d/w trauma will come to evaluate Deanna May DO EM Attending  Received sign out from Dr Arellano, found to have multiple rib fractures, hemodynamically stable. Pulling 1500 on IS. Pending trauma surgery recommendations, to be admitted. reassessed patient, reports pain well controlled at this time. resting comfortably. - Ellen Carranza PA-C

## 2025-04-27 NOTE — ED ADULT TRIAGE NOTE - CHIEF COMPLAINT QUOTE
L side rib fractures s/p mechanical fall yesterday transfer from [place] for trauma eval   takes Eliquis for a fib denies LOC

## 2025-04-27 NOTE — ED ADULT NURSE NOTE - NSFALLHARMRISKINTERV_ED_ALL_ED

## 2025-04-27 NOTE — ED PROVIDER NOTE - OBJECTIVE STATEMENT
83 y/o HTN, Afib on eliquis, male presents as a trauma transfer for  fractures left 5th through 11th ribs with segmental components at the 6  and 7th ribs. intially presented to  with wife for severe L sided mid- back pain after a fall yesterday morning. Reports he was in their empty pool yesterday morning and slipped on the slopped area. He fell on his back and hit the back of his head. No LOC. Suffered mild head pain, now resolved, but still with 10/10 L sided back pain radiating to his left chest

## 2025-04-28 ENCOUNTER — TRANSCRIPTION ENCOUNTER (OUTPATIENT)
Age: 85
End: 2025-04-28

## 2025-04-28 VITALS
OXYGEN SATURATION: 94 % | DIASTOLIC BLOOD PRESSURE: 70 MMHG | HEART RATE: 66 BPM | RESPIRATION RATE: 20 BRPM | SYSTOLIC BLOOD PRESSURE: 158 MMHG

## 2025-04-28 PROCEDURE — 96374 THER/PROPH/DIAG INJ IV PUSH: CPT

## 2025-04-28 PROCEDURE — 82435 ASSAY OF BLOOD CHLORIDE: CPT

## 2025-04-28 PROCEDURE — 85025 COMPLETE CBC W/AUTO DIFF WBC: CPT

## 2025-04-28 PROCEDURE — 84295 ASSAY OF SERUM SODIUM: CPT

## 2025-04-28 PROCEDURE — 71045 X-RAY EXAM CHEST 1 VIEW: CPT | Mod: 26

## 2025-04-28 PROCEDURE — 86901 BLOOD TYPING SEROLOGIC RH(D): CPT

## 2025-04-28 PROCEDURE — 71045 X-RAY EXAM CHEST 1 VIEW: CPT

## 2025-04-28 PROCEDURE — 86850 RBC ANTIBODY SCREEN: CPT

## 2025-04-28 PROCEDURE — 83735 ASSAY OF MAGNESIUM: CPT

## 2025-04-28 PROCEDURE — 80053 COMPREHEN METABOLIC PANEL: CPT

## 2025-04-28 PROCEDURE — 84484 ASSAY OF TROPONIN QUANT: CPT

## 2025-04-28 PROCEDURE — 85730 THROMBOPLASTIN TIME PARTIAL: CPT

## 2025-04-28 PROCEDURE — 83690 ASSAY OF LIPASE: CPT

## 2025-04-28 PROCEDURE — 97161 PT EVAL LOW COMPLEX 20 MIN: CPT

## 2025-04-28 PROCEDURE — 85018 HEMOGLOBIN: CPT

## 2025-04-28 PROCEDURE — 85610 PROTHROMBIN TIME: CPT

## 2025-04-28 PROCEDURE — 85027 COMPLETE CBC AUTOMATED: CPT

## 2025-04-28 PROCEDURE — 82803 BLOOD GASES ANY COMBINATION: CPT

## 2025-04-28 PROCEDURE — 82330 ASSAY OF CALCIUM: CPT

## 2025-04-28 PROCEDURE — 82947 ASSAY GLUCOSE BLOOD QUANT: CPT

## 2025-04-28 PROCEDURE — 84132 ASSAY OF SERUM POTASSIUM: CPT

## 2025-04-28 PROCEDURE — 97166 OT EVAL MOD COMPLEX 45 MIN: CPT

## 2025-04-28 PROCEDURE — 99285 EMERGENCY DEPT VISIT HI MDM: CPT | Mod: 25

## 2025-04-28 PROCEDURE — 93005 ELECTROCARDIOGRAM TRACING: CPT

## 2025-04-28 PROCEDURE — 83605 ASSAY OF LACTIC ACID: CPT

## 2025-04-28 PROCEDURE — 85014 HEMATOCRIT: CPT

## 2025-04-28 PROCEDURE — 84100 ASSAY OF PHOSPHORUS: CPT

## 2025-04-28 PROCEDURE — 86900 BLOOD TYPING SEROLOGIC ABO: CPT

## 2025-04-28 RX ORDER — LOSARTAN POTASSIUM 100 MG/1
100 TABLET, FILM COATED ORAL DAILY
Refills: 0 | Status: DISCONTINUED | OUTPATIENT
Start: 2025-04-28 | End: 2025-04-28

## 2025-04-28 RX ORDER — AMLODIPINE BESYLATE 10 MG/1
1 TABLET ORAL
Refills: 0 | DISCHARGE

## 2025-04-28 RX ORDER — METOPROLOL SUCCINATE 50 MG/1
25 TABLET, EXTENDED RELEASE ORAL ONCE
Refills: 0 | Status: COMPLETED | OUTPATIENT
Start: 2025-04-28 | End: 2025-04-28

## 2025-04-28 RX ORDER — OXYCODONE HYDROCHLORIDE 30 MG/1
1 TABLET ORAL
Qty: 12 | Refills: 0
Start: 2025-04-28 | End: 2025-04-30

## 2025-04-28 RX ORDER — AMLODIPINE BESYLATE 10 MG/1
5 TABLET ORAL DAILY
Refills: 0 | Status: DISCONTINUED | OUTPATIENT
Start: 2025-04-28 | End: 2025-04-28

## 2025-04-28 RX ORDER — ROSUVASTATIN CALCIUM 20 MG/1
5 TABLET, FILM COATED ORAL AT BEDTIME
Refills: 0 | Status: DISCONTINUED | OUTPATIENT
Start: 2025-04-28 | End: 2025-04-28

## 2025-04-28 RX ORDER — METOPROLOL SUCCINATE 50 MG/1
25 TABLET, EXTENDED RELEASE ORAL EVERY 12 HOURS
Refills: 0 | Status: DISCONTINUED | OUTPATIENT
Start: 2025-04-28 | End: 2025-04-28

## 2025-04-28 RX ORDER — APIXABAN 2.5 MG/1
5 TABLET, FILM COATED ORAL EVERY 12 HOURS
Refills: 0 | Status: DISCONTINUED | OUTPATIENT
Start: 2025-04-28 | End: 2025-04-28

## 2025-04-28 RX ADMIN — POLYETHYLENE GLYCOL 3350 17 GRAM(S): 17 POWDER, FOR SOLUTION ORAL at 05:11

## 2025-04-28 RX ADMIN — OXYCODONE HYDROCHLORIDE 5 MILLIGRAM(S): 30 TABLET ORAL at 15:38

## 2025-04-28 RX ADMIN — Medication 400 MILLIGRAM(S): at 15:32

## 2025-04-28 RX ADMIN — OXYCODONE HYDROCHLORIDE 5 MILLIGRAM(S): 30 TABLET ORAL at 07:18

## 2025-04-28 RX ADMIN — OXYCODONE HYDROCHLORIDE 5 MILLIGRAM(S): 30 TABLET ORAL at 12:24

## 2025-04-28 RX ADMIN — LIDOCAINE HYDROCHLORIDE 1 PATCH: 20 JELLY TOPICAL at 07:15

## 2025-04-28 RX ADMIN — OXYCODONE HYDROCHLORIDE 5 MILLIGRAM(S): 30 TABLET ORAL at 13:04

## 2025-04-28 RX ADMIN — LIDOCAINE HYDROCHLORIDE 1 PATCH: 20 JELLY TOPICAL at 03:00

## 2025-04-28 RX ADMIN — ENOXAPARIN SODIUM 40 MILLIGRAM(S): 100 INJECTION SUBCUTANEOUS at 05:11

## 2025-04-28 RX ADMIN — LIDOCAINE HYDROCHLORIDE 1 PATCH: 20 JELLY TOPICAL at 05:11

## 2025-04-28 RX ADMIN — OXYCODONE HYDROCHLORIDE 5 MILLIGRAM(S): 30 TABLET ORAL at 08:10

## 2025-04-28 RX ADMIN — OXYCODONE HYDROCHLORIDE 5 MILLIGRAM(S): 30 TABLET ORAL at 16:30

## 2025-04-28 RX ADMIN — Medication 400 MILLIGRAM(S): at 08:17

## 2025-04-28 RX ADMIN — METOPROLOL SUCCINATE 25 MILLIGRAM(S): 50 TABLET, EXTENDED RELEASE ORAL at 07:53

## 2025-04-28 NOTE — PHYSICAL THERAPY INITIAL EVALUATION ADULT - ACTIVE RANGE OF MOTION EXAMINATION, REHAB EVAL
limited LUE shoulder flexion 2* rib fx, BUE (except L shoulder flex) and BLE WFL/Left UE Active ROM was WFL (within functional limits)/Right UE Active ROM was WFL (within functional limits)/Left LE Active ROM was WFL (within functional limits)/Right LE Active ROM was WFL (within functional limits)

## 2025-04-28 NOTE — PHYSICAL THERAPY INITIAL EVALUATION ADULT - ADDITIONAL COMMENTS
pt lives at home with spouse, +4-5 steps to enter with HR, +14 steps inside to bedroom with HR, PTA ind amb and ADLs, no DME, +tub and walkin shower

## 2025-04-28 NOTE — OCCUPATIONAL THERAPY INITIAL EVALUATION ADULT - PERTINENT HX OF CURRENT PROBLEM, REHAB EVAL
84M PMH Afib (Eliquis, last dose 4/27 AM) presenting as a trauma transfer from [place] after sustaining fall in the empty pool yesterday trying to clean and sustained a fall after slipping. Reports hitting back of his head but no LOC. On 2L NC, saturating 98%.   Injuries:  - Left 5-11 rib fractures with segmental components at the 6-7th ribs [5th-lateral aspect, minimally displaced. 6th-posterior lateral aspect, displaced and anterolateral aspect, nondisplaced. 7th-posterior aspect, nondisplaced and lateral aspect, nondisplaced. 8th-posterior aspect, mildly displaced. 9th-posterior aspect, nondisplaced. 10th-posterior aspect, displaced. 11th-nondisplaced]- Small left-sided pleural effusion with partial atelectasis/airspace, trace right-sided pleural effusion

## 2025-04-28 NOTE — PROGRESS NOTE ADULT - ASSESSMENT
84M PMH Afib (Eliquis, last dose 4/27 AM) presenting as a trauma transfer from [place] after sustaining fall in the empty pool yesterday trying to clean and sustained a fall after slipping. Reports hitting back of his head but no LOC. On 2L NC, saturating 98%.     Injuries:  - Left 5-11 rib fractures with segmental components at the 6-7th ribs  [5th-lateral aspect, minimally displaced  6th-posterior lateral aspect, displaced and anterolateral aspect, nondisplaced.  7th-posterior aspect, nondisplaced and lateral aspect, nondisplaced.  8th-posterior aspect, mildly displaced.  9th-posterior aspect, nondisplaced.  10th-posterior aspect, displaced.  11th-nondisplaced]    - Small left-sided pleural effusion with partial atelectasis/airspace, trace right-sided pleural effusion    Rib score = 2    PLAN:  - Regular diet  - Incentive spirometry  - Multimodal pain control  - PT consult  - pending tertiary exam  - hold eliquis 1 week  - rest of care per SICU      Trauma -[id number]
 85 y/o HTN, Afib on eliquis, male presents as a trauma transfer for  fractures left 5th through 11th ribs with segmental components at the 6  and 7th ribs. intially presented to  with wife for severe L sided mid- back pain after a fall yesterday morning. Reports he was in their empty pool yesterday morning and slipped on the slopped area. He fell on his back and hit the back of his head. No LOC. Suffered mild head pain, now resolved, but still with 10/10 L sided back pain radiating to his left chest.     PLAN:   Neurologic:   - Alert and oriented   - Oxy, tylenol, lido patch for pain control     Respiratory:   - Pulmonary toilet, IS, out of bed     Cardiovascular:   - Hemodynamically stable   - No vasopressors     Gastrointestinal/Nutrition:   - Regular diet   - Miralax/senna     Renal/Genitourinary:   - Monitor urine output   - Correct electrolytes     Hematologic:   - Monitor H/H  - Lovenox for DVT proph     Infectious Disease:   - Monitor for signs of infection     Endocrine:  - Monitor sugars     Lines/Tubes:   - PIV

## 2025-04-28 NOTE — PROGRESS NOTE ADULT - SUBJECTIVE AND OBJECTIVE BOX
SICU PROGRESS NOTE:   83 y/o HTN, Afib on eliquis, male presents as a trauma transfer for  fractures left 5th through 11th ribs with segmental components at the 6  and 7th ribs. intially presented to  with wife for severe L sided mid- back pain after a fall yesterday morning. Reports he was in their empty pool yesterday morning and slipped on the slopped area. He fell on his back and hit the back of his head. No LOC. Suffered mild head pain, now resolved, but still with 10/10 L sided back pain radiating to his left chest.     24 HOUR INTERVAL EVENTS:      SUBJECTIVE/ROS:  [ ] A ten-point review of systems was otherwise negative except as noted.  [ ] Due to altered mental status/intubation, subjective information were not able to be obtained from the patient. History was obtained, to the extent possible, from review of the chart and collateral sources of information.    NEURO  RASS:     GCS:     CAM ICU:  Exam: awake, alert, oriented  Meds: acetaminophen   IVPB .. 1000 milliGRAM(s) IV Intermittent every 6 hours  oxyCODONE    IR 2.5 milliGRAM(s) Oral every 3 hours PRN Moderate Pain (4 - 6)  oxyCODONE    IR 5 milliGRAM(s) Oral every 3 hours PRN Severe Pain (7 - 10)    [x] Adequacy of sedation and pain control has been assessed and adjusted    RESPIRATORY  RR: 20 (04-27-25 @ 23:00) (14 - 22)  SpO2: 97% (04-27-25 @ 23:00) (93% - 99%)  Wt(kg): --  Exam: unlabored, clear to auscultation bilaterally  Mechanical Ventilation:     [N/A] Extubation Readiness Assessed  Meds:     CARDIOVASCULAR  HR: 57 (04-27-25 @ 23:00) (57 - 64)  BP: 150/65 (04-27-25 @ 23:00) (150/65 - 191/77)  BP(mean): 94 (04-27-25 @ 23:00) (94 - 110)  ABP: --  ABP(mean): --  Wt(kg): --  CVP(cm H2O): --  VBG - ( 27 Apr 2025 22:15 )  pH: 7.34  /  pCO2: 49    /  pO2: 51    / HCO3: 26    / Base Excess: 0.1   /  SaO2: 82.9   Lactate: 0.7                Exam: regular rate and rhythm  Cardiac Rhythm: sinus  Perfusion     [x]Adequate   [ ]Inadequate  Mentation   [x]Normal       [ ]Reduced  Extremities  [x]Warm         [ ]Cool  Volume Status [ ]Hypervolemic [x]Euvolemic [ ]Hypovolemic  Meds:     GI/NUTRITION  Exam: soft, nontender, nondistended, incision C/D/I  Diet:  Meds: polyethylene glycol 3350 17 Gram(s) Oral every 12 hours  senna 2 Tablet(s) Oral at bedtime      GENITOURINARY  I&O's Detail    04-27 @ 07:01  -  04-28 @ 02:35  --------------------------------------------------------  IN:    IV PiggyBack: 100 mL  Total IN: 100 mL    OUT:    Voided (mL): 750 mL  Total OUT: 750 mL    Total NET: -650 mL        Weight (kg): 69.4 (04-27 @ 14:08), 72.6 (04-27 @ 09:21)  04-27    140  |  108  |  11  ----------------------------<  115[H]  3.9   |  23  |  0.93    Ca    8.6      27 Apr 2025 22:25  Phos  2.6     04-27  Mg     2.1     04-27    TPro  6.4  /  Alb  3.6  /  TBili  0.3  /  DBili  x   /  AST  18  /  ALT  12  /  AlkPhos  42  04-27    [ ] Arciniega catheter, indication: N/A  Meds: sodium phosphate 15 milliMole(s)/250 mL IVPB 15 milliMole(s) IV Intermittent once      HEMATOLOGIC  Meds: enoxaparin Injectable 40 milliGRAM(s) SubCutaneous every 24 hours    [x] VTE Prophylaxis                        11.4   6.19  )-----------( 160      ( 27 Apr 2025 22:25 )             34.2     PT/INR - ( 27 Apr 2025 22:25 )   PT: 11.4 sec;   INR: 0.99 ratio         PTT - ( 27 Apr 2025 22:25 )  PTT:29.5 sec  Transfusion     [ ] PRBC   [ ] Platelets   [ ] FFP   [ ] Cryoprecipitate    INFECTIOUS DISEASES  WBC Count: 6.19 K/uL (04-27 @ 22:25)  WBC Count: 6.24 K/uL (04-27 @ 14:52)  WBC Count: 6.42 K/uL (04-27 @ 10:55)    RECENT CULTURES:    Meds:     ENDOCRINE  CAPILLARY BLOOD GLUCOSE        Meds:     OTHER MEDICATIONS:  chlorhexidine 2% Cloths 1 Application(s) Topical <User Schedule>  lidocaine   4% Patch 1 Patch Transdermal every 24 hours      CODE STATUS:      IMAGING: SICU PROGRESS NOTE:   85 y/o HTN, Afib on eliquis, male presents as a trauma transfer for  fractures left 5th through 11th ribs with segmental components at the 6  and 7th ribs. intially presented to  with wife for severe L sided mid- back pain after a fall yesterday morning. Reports he was in their empty pool yesterday morning and slipped on the slopped area. He fell on his back and hit the back of his head. No LOC. Suffered mild head pain, now resolved, but still with 10/10 L sided back pain radiating to his left chest.     24 HOUR INTERVAL EVENTS:      SUBJECTIVE/ROS:  [x] A ten-point review of systems was otherwise negative except as noted.  [ ] Due to altered mental status/intubation, subjective information were not able to be obtained from the patient. History was obtained, to the extent possible, from review of the chart and collateral sources of information.    NEURO  RASS:     GCS:     CAM ICU:  Exam: awake, alert, oriented  Meds: acetaminophen   IVPB .. 1000 milliGRAM(s) IV Intermittent every 6 hours  oxyCODONE    IR 2.5 milliGRAM(s) Oral every 3 hours PRN Moderate Pain (4 - 6)  oxyCODONE    IR 5 milliGRAM(s) Oral every 3 hours PRN Severe Pain (7 - 10)    [x] Adequacy of sedation and pain control has been assessed and adjusted    RESPIRATORY  RR: 20 (04-27-25 @ 23:00) (14 - 22)  SpO2: 97% (04-27-25 @ 23:00) (93% - 99%)  Wt(kg): --  Exam: unlabored, clear to auscultation bilaterally  Mechanical Ventilation:     [N/A] Extubation Readiness Assessed  Meds:     CARDIOVASCULAR  HR: 57 (04-27-25 @ 23:00) (57 - 64)  BP: 150/65 (04-27-25 @ 23:00) (150/65 - 191/77)  BP(mean): 94 (04-27-25 @ 23:00) (94 - 110)  ABP: --  ABP(mean): --  Wt(kg): --  CVP(cm H2O): --  VBG - ( 27 Apr 2025 22:15 )  pH: 7.34  /  pCO2: 49    /  pO2: 51    / HCO3: 26    / Base Excess: 0.1   /  SaO2: 82.9   Lactate: 0.7                Exam: regular rate and rhythm  Cardiac Rhythm: sinus  Perfusion     [x]Adequate   [ ]Inadequate  Mentation   [x]Normal       [ ]Reduced  Extremities  [x]Warm         [ ]Cool  Volume Status [ ]Hypervolemic [x]Euvolemic [ ]Hypovolemic  Meds:     GI/NUTRITION  Exam: soft, nontender, nondistended, incision C/D/I  Diet:  Meds: polyethylene glycol 3350 17 Gram(s) Oral every 12 hours  senna 2 Tablet(s) Oral at bedtime      GENITOURINARY  I&O's Detail    04-27 @ 07:01  -  04-28 @ 02:35  --------------------------------------------------------  IN:    IV PiggyBack: 100 mL  Total IN: 100 mL    OUT:    Voided (mL): 750 mL  Total OUT: 750 mL    Total NET: -650 mL        Weight (kg): 69.4 (04-27 @ 14:08), 72.6 (04-27 @ 09:21)  04-27    140  |  108  |  11  ----------------------------<  115[H]  3.9   |  23  |  0.93    Ca    8.6      27 Apr 2025 22:25  Phos  2.6     04-27  Mg     2.1     04-27    TPro  6.4  /  Alb  3.6  /  TBili  0.3  /  DBili  x   /  AST  18  /  ALT  12  /  AlkPhos  42  04-27    [ ] Arciniega catheter, indication: N/A  Meds: sodium phosphate 15 milliMole(s)/250 mL IVPB 15 milliMole(s) IV Intermittent once      HEMATOLOGIC  Meds: enoxaparin Injectable 40 milliGRAM(s) SubCutaneous every 24 hours    [x] VTE Prophylaxis                        11.4   6.19  )-----------( 160      ( 27 Apr 2025 22:25 )             34.2     PT/INR - ( 27 Apr 2025 22:25 )   PT: 11.4 sec;   INR: 0.99 ratio         PTT - ( 27 Apr 2025 22:25 )  PTT:29.5 sec  Transfusion     [ ] PRBC   [ ] Platelets   [ ] FFP   [ ] Cryoprecipitate    INFECTIOUS DISEASES  WBC Count: 6.19 K/uL (04-27 @ 22:25)  WBC Count: 6.24 K/uL (04-27 @ 14:52)  WBC Count: 6.42 K/uL (04-27 @ 10:55)    RECENT CULTURES:    Meds:     ENDOCRINE  CAPILLARY BLOOD GLUCOSE        Meds:     OTHER MEDICATIONS:  chlorhexidine 2% Cloths 1 Application(s) Topical <User Schedule>  lidocaine   4% Patch 1 Patch Transdermal every 24 hours      CODE STATUS:      IMAGING:

## 2025-04-28 NOTE — DISCHARGE NOTE PROVIDER - NSDCMRMEDTOKEN_GEN_ALL_CORE_FT
amLODIPine 5 mg oral tablet: 1 tab(s) orally once a day  Eliquis 5 mg oral tablet: 1 tab(s) orally 2 times a day  losartan 100 mg oral tablet: 1 tab(s) orally once a day  metoprolol succinate 50 mg oral tablet, extended release: 1 tab(s) orally once a day  rosuvastatin 5 mg oral tablet: 1 tab(s) orally once a day   amLODIPine 5 mg oral tablet: 1 tab(s) orally once a day  Eliquis 5 mg oral tablet: 1 tab(s) orally 2 times a day  losartan 100 mg oral tablet: 1 tab(s) orally once a day  metoprolol succinate 50 mg oral tablet, extended release: 1 tab(s) orally once a day  oxyCODONE 5 mg oral tablet: 1 tab(s) orally every 6 hours MDD: 4 Tablets  rosuvastatin 5 mg oral tablet: 1 tab(s) orally once a day

## 2025-04-28 NOTE — PHYSICAL THERAPY INITIAL EVALUATION ADULT - PERTINENT HX OF CURRENT PROBLEM, REHAB EVAL
Pt is a 84M admitted to [facility] on 4/27/25 PMH Afib (Eliquis, last dose 4/27 AM) presenting as a trauma transfer from [place] after sustaining fall in the empty pool yesterday trying to clean and sustained a fall after slipping. Reports hitting back of his head but no LOC. On 2L NC, saturating 98%. Injuries: Left 5-11 rib fractures with segmental components at the 6-7th ribs [5th-lateral aspect, minimally displaced. 6th-posterior lateral aspect, displaced and anterolateral aspect, nondisplaced. 7th-posterior aspect, nondisplaced and lateral aspect, nondisplaced. 8th-posterior aspect, mildly displaced. 9th-posterior aspect, nondisplaced. 10th-posterior aspect, displaced. 11th-nondisplaced]- Small left-sided pleural effusion with partial atelectasis/airspace, trace right-sided pleural effusion

## 2025-04-28 NOTE — CHART NOTE - NSCHARTNOTEFT_GEN_A_CORE
Tertiary Trauma Survey (TTS)    Date of TTS: 04/28/25                 Time:   Admit Date: 04/27/25                  Trauma Activation:  Admit GCS: E-     V-     M-     HPI:  84M PMH Afib (Eliquis, last dose 4/27 AM) presenting as a trauma transfer from [place] after sustaining fall in the empty pool yesterday trying to clean and sustained a fall after slipping. Reports hitting back of his head but no LOC. Denies shortness of breath, but has right sided chest wall pain.    On afebrile, hemodynamically stable. Saturating 98% on 2L NC.  Underwent CTH, c-spine, CAP at [place]  Imaging significant for left 5-11 rib fractures with segmental components at the 6-7th ribs, small left-sided pleural effusion with partial atelectasis/airspace, trace right-sided pleural effusion, no pneumothorax (27 Apr 2025 16:11)      PAST MEDICAL & SURGICAL HISTORY:  Hypertension, unspecified type  Borderline diabetic  Atrial fibrillation - ablation in 10/21  Former cigarette smoker  Aortic stenosis  Tubular adenoma of colon  H/O hyperthyroidism  H/O hemorrhoids  History of first degree AV block  BPH (benign prostatic hyperplasia)  Bilateral cataracts  DJD (degenerative joint disease)  Sciatica  History of aortic insufficiency  Renal insufficiency  Mitral regurgitation  H/O cataract extraction right eye  History of arthroscopic surgery of shoulder BL      [  ] No significant past history as reviewed with the patient and family    FAMILY HISTORY:    [x] Family history not pertinent as reviewed with the patient and family    SOCIAL HISTORY:    Medications (inpatient): acetaminophen   IVPB .. 1000 milliGRAM(s) IV Intermittent every 6 hours  amLODIPine   Tablet 5 milliGRAM(s) Oral daily  chlorhexidine 2% Cloths 1 Application(s) Topical <User Schedule>  enoxaparin Injectable 40 milliGRAM(s) SubCutaneous every 24 hours  lidocaine   4% Patch 1 Patch Transdermal every 24 hours  losartan 100 milliGRAM(s) Oral daily  metoprolol tartrate 25 milliGRAM(s) Oral every 12 hours  polyethylene glycol 3350 17 Gram(s) Oral every 12 hours  rosuvastatin 5 milliGRAM(s) Oral at bedtime  senna 2 Tablet(s) Oral at bedtime    Medications (PRN):oxyCODONE    IR 2.5 milliGRAM(s) Oral every 3 hours PRN  oxyCODONE    IR 5 milliGRAM(s) Oral every 3 hours PRN    Allergies: amiodarone (Swelling)  (Intolerances: )    Vital Signs Last 24 Hrs  T(C): 36.8 (28 Apr 2025 11:00), Max: 36.9 (27 Apr 2025 19:00)  T(F): 98.2 (28 Apr 2025 11:00), Max: 98.5 (27 Apr 2025 19:00)  HR: 65 (28 Apr 2025 11:00) (56 - 73)  BP: 160/74 (28 Apr 2025 11:00) (148/66 - 191/77)  BP(mean): 106 (28 Apr 2025 11:00) (94 - 115)  RR: 21 (28 Apr 2025 11:00) (12 - 22)  SpO2: 95% (28 Apr 2025 11:00) (93% - 99%)    Parameters below as of 28 Apr 2025 09:47  Patient On (Oxygen Delivery Method): room air      Drug Dosing Weight  Height (cm): 165.1 (27 Apr 2025 14:08)  Weight (kg): 69.4 (27 Apr 2025 14:08)  BMI (kg/m2): 25.5 (27 Apr 2025 14:08)  BSA (m2): 1.77 (27 Apr 2025 14:08)                          11.4   6.19  )-----------( 160      ( 27 Apr 2025 22:25 )             34.2     04-27    140  |  108  |  11  ----------------------------<  115[H]  3.9   |  23  |  0.93    Ca    8.6      27 Apr 2025 22:25  Phos  2.6     04-27  Mg     2.1     04-27    TPro  6.4  /  Alb  3.6  /  TBili  0.3  /  DBili  x   /  AST  18  /  ALT  12  /  AlkPhos  42  04-27    PT/INR - ( 27 Apr 2025 22:25 )   PT: 11.4 sec;   INR: 0.99 ratio         PTT - ( 27 Apr 2025 22:25 )  PTT:29.5 sec  Urinalysis Basic - ( 27 Apr 2025 22:25 )    Color: x / Appearance: x / SG: x / pH: x  Gluc: 115 mg/dL / Ketone: x  / Bili: x / Urobili: x   Blood: x / Protein: x / Nitrite: x   Leuk Esterase: x / RBC: x / WBC x   Sq Epi: x / Non Sq Epi: x / Bacteria: x        List Injuries Identified to Date:  - Left 5-11 rib fractures with segmental components at the 6-7th ribs  [5th-lateral aspect, minimally displaced  6th-posterior lateral aspect, displaced and anterolateral aspect, nondisplaced.  7th-posterior aspect, nondisplaced and lateral aspect, nondisplaced.  8th-posterior aspect, mildly displaced.  9th-posterior aspect, nondisplaced.  10th-posterior aspect, displaced.  11th-nondisplaced]  - Small left-sided pleural effusion with partial atelectasis/airspace, trace right-sided pleural        List Operative and Interventional Radiological Procedures:     Consults (Date):  [  ] Neurosurgery   [  ] Orthopedics  [  ] Plastics  [  ] Urology  [  ] PM&R  [  ] Social Work    RADIOLOGICAL FINDINGS REVIEW:    CXR:  ACC: [id number] EXAM: XR CHEST AP OR PA 1V ORDERED BY: RAMESH MENA  PROCEDURE DATE: 04/27/2025  INTERPRETATION: EXAMINATION: XR CHEST  CLINICAL INDICATION: Trauma Code  TECHNIQUE: Single frontal, portable view of the chest was obtained.  COMPARISON: Chest x-ray 11/29/2017.    FINDINGS:  The heart is not accurately assessed in this AP projection.  No focal consolidation is seen. Subsegmental linear atelectatic changes are seen at both lung bases. No pleural effusion is seen.  No acute displaced bony abnormality. Prior right rotator cuff repair.    IMPRESSION:  Bibasilar subsegmental atelectasis.        Head, C-Spine CT:  ACC: [id number] EXAM: CT CERVICAL SPINE ORDERED BY: JOSE BUTLER  ACC: [id number] EXAM: CT BRAIN ORDERED BY: JOSE BUTLER  PROCEDURE DATE: 04/27/2025    INTERPRETATION: CLINICAL INDICATION: Trauma.  Technique: Noncontrast axial CT of the head and cervical spine was performed. Coronal and sagittal reformats were obtained.  COMPARISON: None.    FINDINGS:  Head CT:  The ventricles and sulci are prominent, compatible with age-related generalized cerebral volume loss. There is periventricular hypoattenuation, likely reflecting chronic microvascular ischemic changes. There is no intraparenchymal hematoma, mass effect or midline shift. No abnormal extra-axial fluid collections or hemorrhages are present.    The calvarium is intact. The visualized intraorbital compartment, paranasal sinuses and tympanomastoid cavities appear free of acute disease.      Cervical spine CT:  Alignment is maintained. Vertebral bodies are normal in height, without evidence of fracture or dislocation. Prevertebral soft tissues are within normal limits without soft tissue swelling or hematoma.    There is multilevel intervertebral disc height loss. Multilevel endplate spondylytic changes are present. Multilevel disc bulging results in multilevel neural foraminal narrowing. No high-grade spinal canal stenosis is identified. The visualized lung apices are within normal limits.      IMPRESSION:  CT HEAD: No acute abnormality. Chronic changes as above.  CT CERVICAL SPINE: No acute abnormality. Chronic changes as above.        Chest/Abd/Pelvis CT:  ACC: [id number] EXAM: CT ABDOMEN AND PELVIS ORDERED BY: JOSE BUTLER  ACC: [id number] EXAM: CT CHEST ORDERED BY: JOSE BUTLER  PROCEDURE DATE: 04/27/2025  INTERPRETATION: CLINICAL INFORMATION: Left-sided back pain radiating to the chest following fall.  COMPARISON: CT scan chest abdomen pelvis 9/16/2024  CONTRAST/COMPLICATIONS:  IV Contrast: NONE  Oral Contrast: NONE  PROCEDURE:  CT of the Chest, Abdomen and Pelvis was performed.  Sagittal and coronal reformats were performed.    FINDINGS:    CHEST:  LUNGS AND LARGE AIRWAYS: PLEURA:  There is a small left-sided pleural effusion with partial atelectasis/airspace consolidation left lower lobe.  No pneumothorax.  There is a trace right pleural effusion with patchy airspace consolidation right lung base.  The central airways are patent.    VESSELS: Atherosclerotic changes thoracic aorta and coronary artery calcification.    HEART:  The heart is enlarged.  Dense aortic valve calcification.  No pericardial effusion.    MEDIASTINUM AND TRAN:  Stable shotty mediastinal lymph nodes.  No mediastinal hematoma.    CHEST WALL AND LOWER NECK: Within normal limits.    ABDOMEN AND PELVIS:  Evaluation of the solid organ parenchyma is limited without intravenous contrast.    LIVER: Within normal limits.  BILE DUCTS: Normal caliber.  GALLBLADDER: Within normal limits.  SPLEEN: Within normal limits.  Accessory splenule.  PANCREAS: Within normal limits.  ADRENALS: Within normal limits.  KIDNEYS/URETERS:  Subcentimeter hypodense lesion right kidney is too small for characterization, stable.  Mild bilateral perinephric stranding.  No hydronephrosis.  BLADDER: Within normal limits.  REPRODUCTIVE ORGANS:  The prostate is not enlarged.  BOWEL:  Retained contents within the stomach.  Colonic fecal retention; no bowel obstruction.  Sigmoid diverticulosis.  Appendix is normal.  PERITONEUM/RETROPERITONEUM: Within normal limits.  VESSELS: Atherosclerotic changes.  LYMPH NODES: No lymphadenopathy.    ABDOMINAL WALL:  Small fat-containing umbilical hernia.  Small bilateral fat-containing inguinal hernias.    BONES:  There are 13 rib-bearing vertebral bodies.  There are multiple left-sided rib fracture deformities as follows:    5th-lateral aspect, minimally displaced.  6th-posterior lateral aspect, displaced and anterolateral aspect, nondisplaced.  7th-posterior aspect, nondisplaced and lateral aspect, nondisplaced.  8th-posterior aspect, mildly displaced.  9th-posterior aspect, nondisplaced.  10th-posterior aspect, displaced.  11th-nondisplaced.    Degenerative changes spine.    IMPRESSION:  Fractures left 5th through 11th ribs with segmental components at the 6 and 7th ribs.    Small left-sided pleural effusion with partial atelectasis/airspace consolidation left lower lobe.  No pneumothorax.    Trace right-sided pleural effusion with probable subpleural atelectasis.    No acute visceral organ injury noted given limitations of noncontrast exam.    Other findings as discussed above.              Other:    Physical Exam:  ****************  General: NAD, resting comfortably  HEENT: NC/AT, EOMI, normal hearing  Pulmonary: normal resp effort on RA  Abdominal: soft, ND/NT  Extremities: WWP, normal strength, no clubbing/cyanosis/edema  Neuro: A/O x 3, CNs II-XII grossly intact, normal sensation, no focal deficits  Pulses: palpable distal pulses      Interpretation of Findings:  84M PMH Afib (Eliquis, last dose 4/27 AM) presenting as a trauma transfer from [place] after sustaining fall in the empty pool yesterday trying to clean and sustained a fall after slipping. Reports hitting back of his head but no LOC. On 2L NC, saturating 98%.     Injuries:  - Left 5-11 rib fractures with segmental components at the 6-7th ribs  [5th-lateral aspect, minimally displaced  6th-posterior lateral aspect, displaced and anterolateral aspect, nondisplaced.  7th-posterior aspect, nondisplaced and lateral aspect, nondisplaced.  8th-posterior aspect, mildly displaced.  9th-posterior aspect, nondisplaced.  10th-posterior aspect, displaced.  11th-nondisplaced]    - Small left-sided pleural effusion with partial atelectasis/airspace, trace right-sided pleural effusion    Rib score = 2    PLAN:  - Regular diet  - Incentive spirometry  - Multimodal pain control  - PT consult  - hold eliquis 1 week  - rest of care per SICU Tertiary Trauma Survey (TTS)    Date of TTS: 04/28/25                 Time:   Admit Date: 04/27/25                  Trauma Activation:  Admit GCS: E-     V-     M-     HPI:  84M PMH Afib (Eliquis, last dose 4/27 AM) presenting as a trauma transfer from [place] after sustaining fall in the empty pool yesterday trying to clean and sustained a fall after slipping. Reports hitting back of his head but no LOC. Denies shortness of breath, but has right sided chest wall pain.    On afebrile, hemodynamically stable. Saturating 98% on 2L NC.  Underwent CTH, c-spine, CAP at [place]  Imaging significant for left 5-11 rib fractures with segmental components at the 6-7th ribs, small left-sided pleural effusion with partial atelectasis/airspace, trace right-sided pleural effusion, no pneumothorax (27 Apr 2025 16:11)      PAST MEDICAL & SURGICAL HISTORY:  Hypertension, unspecified type  Borderline diabetic  Atrial fibrillation - ablation in 10/21  Former cigarette smoker  Aortic stenosis  Tubular adenoma of colon  H/O hyperthyroidism  H/O hemorrhoids  History of first degree AV block  BPH (benign prostatic hyperplasia)  Bilateral cataracts  DJD (degenerative joint disease)  Sciatica  History of aortic insufficiency  Renal insufficiency  Mitral regurgitation  H/O cataract extraction right eye  History of arthroscopic surgery of shoulder BL      [  ] No significant past history as reviewed with the patient and family    FAMILY HISTORY:    [x] Family history not pertinent as reviewed with the patient and family    SOCIAL HISTORY:    Medications (inpatient): acetaminophen   IVPB .. 1000 milliGRAM(s) IV Intermittent every 6 hours  amLODIPine   Tablet 5 milliGRAM(s) Oral daily  chlorhexidine 2% Cloths 1 Application(s) Topical <User Schedule>  enoxaparin Injectable 40 milliGRAM(s) SubCutaneous every 24 hours  lidocaine   4% Patch 1 Patch Transdermal every 24 hours  losartan 100 milliGRAM(s) Oral daily  metoprolol tartrate 25 milliGRAM(s) Oral every 12 hours  polyethylene glycol 3350 17 Gram(s) Oral every 12 hours  rosuvastatin 5 milliGRAM(s) Oral at bedtime  senna 2 Tablet(s) Oral at bedtime    Medications (PRN):oxyCODONE    IR 2.5 milliGRAM(s) Oral every 3 hours PRN  oxyCODONE    IR 5 milliGRAM(s) Oral every 3 hours PRN    Allergies: amiodarone (Swelling)  (Intolerances: )    Vital Signs Last 24 Hrs  T(C): 36.8 (28 Apr 2025 11:00), Max: 36.9 (27 Apr 2025 19:00)  T(F): 98.2 (28 Apr 2025 11:00), Max: 98.5 (27 Apr 2025 19:00)  HR: 65 (28 Apr 2025 11:00) (56 - 73)  BP: 160/74 (28 Apr 2025 11:00) (148/66 - 191/77)  BP(mean): 106 (28 Apr 2025 11:00) (94 - 115)  RR: 21 (28 Apr 2025 11:00) (12 - 22)  SpO2: 95% (28 Apr 2025 11:00) (93% - 99%)    Parameters below as of 28 Apr 2025 09:47  Patient On (Oxygen Delivery Method): room air      Drug Dosing Weight  Height (cm): 165.1 (27 Apr 2025 14:08)  Weight (kg): 69.4 (27 Apr 2025 14:08)  BMI (kg/m2): 25.5 (27 Apr 2025 14:08)  BSA (m2): 1.77 (27 Apr 2025 14:08)                          11.4   6.19  )-----------( 160      ( 27 Apr 2025 22:25 )             34.2     04-27    140  |  108  |  11  ----------------------------<  115[H]  3.9   |  23  |  0.93    Ca    8.6      27 Apr 2025 22:25  Phos  2.6     04-27  Mg     2.1     04-27    TPro  6.4  /  Alb  3.6  /  TBili  0.3  /  DBili  x   /  AST  18  /  ALT  12  /  AlkPhos  42  04-27    PT/INR - ( 27 Apr 2025 22:25 )   PT: 11.4 sec;   INR: 0.99 ratio         PTT - ( 27 Apr 2025 22:25 )  PTT:29.5 sec  Urinalysis Basic - ( 27 Apr 2025 22:25 )    Color: x / Appearance: x / SG: x / pH: x  Gluc: 115 mg/dL / Ketone: x  / Bili: x / Urobili: x   Blood: x / Protein: x / Nitrite: x   Leuk Esterase: x / RBC: x / WBC x   Sq Epi: x / Non Sq Epi: x / Bacteria: x        List Injuries Identified to Date:  - Left 5-11 rib fractures with segmental components at the 6-7th ribs  [5th-lateral aspect, minimally displaced  6th-posterior lateral aspect, displaced and anterolateral aspect, nondisplaced.  7th-posterior aspect, nondisplaced and lateral aspect, nondisplaced.  8th-posterior aspect, mildly displaced.  9th-posterior aspect, nondisplaced.  10th-posterior aspect, displaced.  11th-nondisplaced]  - Small left-sided pleural effusion with partial atelectasis/airspace, trace right-sided pleural        List Operative and Interventional Radiological Procedures:     Consults (Date):  [  ] Neurosurgery   [  ] Orthopedics  [  ] Plastics  [  ] Urology  [  ] PM&R  [  ] Social Work    RADIOLOGICAL FINDINGS REVIEW:    CXR:  ACC: [id number] EXAM: XR CHEST AP OR PA 1V ORDERED BY: RAMESH MENA  PROCEDURE DATE: 04/27/2025  INTERPRETATION: EXAMINATION: XR CHEST  CLINICAL INDICATION: Trauma Code  TECHNIQUE: Single frontal, portable view of the chest was obtained.  COMPARISON: Chest x-ray 11/29/2017.    FINDINGS:  The heart is not accurately assessed in this AP projection.  No focal consolidation is seen. Subsegmental linear atelectatic changes are seen at both lung bases. No pleural effusion is seen.  No acute displaced bony abnormality. Prior right rotator cuff repair.    IMPRESSION:  Bibasilar subsegmental atelectasis.        Head, C-Spine CT:  ACC: [id number] EXAM: CT CERVICAL SPINE ORDERED BY: JOSE BUTLER  ACC: [id number] EXAM: CT BRAIN ORDERED BY: JOSE BUTLER  PROCEDURE DATE: 04/27/2025    INTERPRETATION: CLINICAL INDICATION: Trauma.  Technique: Noncontrast axial CT of the head and cervical spine was performed. Coronal and sagittal reformats were obtained.  COMPARISON: None.    FINDINGS:  Head CT:  The ventricles and sulci are prominent, compatible with age-related generalized cerebral volume loss. There is periventricular hypoattenuation, likely reflecting chronic microvascular ischemic changes. There is no intraparenchymal hematoma, mass effect or midline shift. No abnormal extra-axial fluid collections or hemorrhages are present.    The calvarium is intact. The visualized intraorbital compartment, paranasal sinuses and tympanomastoid cavities appear free of acute disease.      Cervical spine CT:  Alignment is maintained. Vertebral bodies are normal in height, without evidence of fracture or dislocation. Prevertebral soft tissues are within normal limits without soft tissue swelling or hematoma.    There is multilevel intervertebral disc height loss. Multilevel endplate spondylytic changes are present. Multilevel disc bulging results in multilevel neural foraminal narrowing. No high-grade spinal canal stenosis is identified. The visualized lung apices are within normal limits.      IMPRESSION:  CT HEAD: No acute abnormality. Chronic changes as above.  CT CERVICAL SPINE: No acute abnormality. Chronic changes as above.        Chest/Abd/Pelvis CT:  ACC: [id number] EXAM: CT ABDOMEN AND PELVIS ORDERED BY: JOSE BUTLER  ACC: [id number] EXAM: CT CHEST ORDERED BY: JOSE BUTLER  PROCEDURE DATE: 04/27/2025  INTERPRETATION: CLINICAL INFORMATION: Left-sided back pain radiating to the chest following fall.  COMPARISON: CT scan chest abdomen pelvis 9/16/2024  CONTRAST/COMPLICATIONS:  IV Contrast: NONE  Oral Contrast: NONE  PROCEDURE:  CT of the Chest, Abdomen and Pelvis was performed.  Sagittal and coronal reformats were performed.    FINDINGS:    CHEST:  LUNGS AND LARGE AIRWAYS: PLEURA:  There is a small left-sided pleural effusion with partial atelectasis/airspace consolidation left lower lobe.  No pneumothorax.  There is a trace right pleural effusion with patchy airspace consolidation right lung base.  The central airways are patent.    VESSELS: Atherosclerotic changes thoracic aorta and coronary artery calcification.    HEART:  The heart is enlarged.  Dense aortic valve calcification.  No pericardial effusion.    MEDIASTINUM AND TRAN:  Stable shotty mediastinal lymph nodes.  No mediastinal hematoma.    CHEST WALL AND LOWER NECK: Within normal limits.    ABDOMEN AND PELVIS:  Evaluation of the solid organ parenchyma is limited without intravenous contrast.    LIVER: Within normal limits.  BILE DUCTS: Normal caliber.  GALLBLADDER: Within normal limits.  SPLEEN: Within normal limits.  Accessory splenule.  PANCREAS: Within normal limits.  ADRENALS: Within normal limits.  KIDNEYS/URETERS:  Subcentimeter hypodense lesion right kidney is too small for characterization, stable.  Mild bilateral perinephric stranding.  No hydronephrosis.  BLADDER: Within normal limits.  REPRODUCTIVE ORGANS:  The prostate is not enlarged.  BOWEL:  Retained contents within the stomach.  Colonic fecal retention; no bowel obstruction.  Sigmoid diverticulosis.  Appendix is normal.  PERITONEUM/RETROPERITONEUM: Within normal limits.  VESSELS: Atherosclerotic changes.  LYMPH NODES: No lymphadenopathy.    ABDOMINAL WALL:  Small fat-containing umbilical hernia.  Small bilateral fat-containing inguinal hernias.    BONES:  There are 13 rib-bearing vertebral bodies.  There are multiple left-sided rib fracture deformities as follows:    5th-lateral aspect, minimally displaced.  6th-posterior lateral aspect, displaced and anterolateral aspect, nondisplaced.  7th-posterior aspect, nondisplaced and lateral aspect, nondisplaced.  8th-posterior aspect, mildly displaced.  9th-posterior aspect, nondisplaced.  10th-posterior aspect, displaced.  11th-nondisplaced.    Degenerative changes spine.    IMPRESSION:  Fractures left 5th through 11th ribs with segmental components at the 6 and 7th ribs.    Small left-sided pleural effusion with partial atelectasis/airspace consolidation left lower lobe.  No pneumothorax.    Trace right-sided pleural effusion with probable subpleural atelectasis.    No acute visceral organ injury noted given limitations of noncontrast exam.    Other findings as discussed above.              Other:    Physical Exam:    General: NAD, resting comfortably  HEENT: NC/AT, EOMI, normal hearing  Pulmonary: normal resp effort on RA  Chest: no ttp clavicles, sternum  Back: no ttp c/t/l/s spine, scapulae  Abdominal: soft, ND/NT  Extremities: WWP, normal strength, no clubbing/cyanosis/edema  Neuro: A/O x 3, CNs II-XII grossly intact, normal sensation, no focal deficits  Pulses: palpable distal pulses      Interpretation of Findings:  84M PMH Afib (Eliquis, last dose 4/27 AM) presenting as a trauma transfer from [place] after sustaining fall in the empty pool yesterday trying to clean and sustained a fall after slipping. Reports hitting back of his head but no LOC. On 2L NC, saturating 98%.     Injuries:  - Left 5-11 rib fractures with segmental components at the 6-7th ribs  [5th-lateral aspect, minimally displaced  6th-posterior lateral aspect, displaced and anterolateral aspect, nondisplaced.  7th-posterior aspect, nondisplaced and lateral aspect, nondisplaced.  8th-posterior aspect, mildly displaced.  9th-posterior aspect, nondisplaced.  10th-posterior aspect, displaced.  11th-nondisplaced]    - Small left-sided pleural effusion with partial atelectasis/airspace, trace right-sided pleural effusion    Rib score = 2    PLAN:  - Regular diet  - Incentive spirometry  - Multimodal pain control  - PT, OT - no needs  - stable for dc

## 2025-04-28 NOTE — DISCHARGE NOTE NURSING/CASE MANAGEMENT/SOCIAL WORK - NSDCVIVACCINE_GEN_ALL_CORE_FT
Tdap; 18-Jul-2020 13:38; Myerson, Jill (RN); [employer]; [id number] (Exp. Date: 17-Sep-2021); IntraMuscular; Deltoid Left.; 0.5 milliLiter(s); VIS (VIS Published: 09-May-2013, VIS Presented: 18-Jul-2020);

## 2025-04-28 NOTE — DISCHARGE NOTE PROVIDER - CARE PROVIDER_API CALL
Kehinde Van [facility]  Internal Medicine  [address] [place] [address]  Phone: [phone]  Fax: [phone]  Follow Up Time: 1 week

## 2025-04-28 NOTE — DISCHARGE NOTE PROVIDER - HOSPITAL COURSE
HPI:  84M PMH Afib (Eliquis, last dose 4/27 AM) presenting as a trauma transfer from [place] after sustaining fall in the empty pool yesterday trying to clean and sustained a fall after slipping. Reports hitting back of his head but no LOC. Denies shortness of breath, but has right sided chest wall pain.  On afebrile, hemodynamically stable. Saturating 98% on 2L NC.  Underwent CTH, c-spine, CAP at [place]  Imaging significant for left 5-11 rib fractures with segmental components at the 6-7th ribs, small left-sided pleural effusion with partial atelectasis/airspace, trace right-sided pleural effusion, no pneumothorax (27 Apr 2025 16:11)    Patient admitted to trauma surgery.   Injuries:  -Left 5-11 rib fractures with segmental components at the 6-7th ribs  5th-lateral aspect, minimally displaced  6th-posterior lateral aspect, displaced and anterolateral aspect, nondisplaced.  7th-posterior aspect, nondisplaced and lateral aspect, nondisplaced.  8th-posterior aspect, mildly displaced.  9th-posterior aspect, nondisplaced.  10th-posterior aspect, displaced.  11th-nondisplaced]  - Small left-sided pleural effusion with partial atelectasis/airspace, trace right-sided pleural effusion    SICU consulted and patient accepted for close monitoring. Multimodal pain control. Serial CXRs stable.  Regular diet. Incentive spirometry encouraged. PT and OT evaluated patient - no skilled PT needs. Tertiary exam complete. Home dose of Eliquis held (x 1 week). Patient remained stable. Cleared for discharge.

## 2025-04-28 NOTE — DISCHARGE NOTE PROVIDER - NSDCCPCAREPLAN_GEN_ALL_CORE_FT
PRINCIPAL DISCHARGE DIAGNOSIS  Diagnosis: Multiple rib fractures  Assessment and Plan of Treatment: Notify your physician/surgeon and return to ER for difficulty breathing, shortness of breath, for temperatures greater than 101, chills sweats, pain not controlled with pain medications, persistent nausea and vomiting, or acutely concerning matters to you, that may require urgent medical attention.

## 2025-04-28 NOTE — OCCUPATIONAL THERAPY INITIAL EVALUATION ADULT - ADDITIONAL COMMENTS
Pt lives in a private house w/ spouse 4 steps to enter 1 flight. Pt was independent with all functional mobility and ADLs prior to admission without AD.

## 2025-04-28 NOTE — PHYSICAL THERAPY INITIAL EVALUATION ADULT - GENERAL OBSERVATIONS, REHAB EVAL
Pt presents as a trauma transfer for  fractures left 5th through 11th ribs with segmental components at the 6  and 7th ribs. intially presented to [facility] with wife for severe L sided mid- back pain after a fall yesterday morning. Reports he was in their empty pool yesterday morning and slipped on the slopped area. He fell on his back and hit the back of his head. No LOC. Suffered mild head pain, now resolved, but still with 10/10 L sided back pain radiating to his left chest. Cleared by AM rounds and RN Richard for PT eval. Pt received sitting in bedside chair, +ICU monitoring, +IVL, NAD, spouse at bedside.

## 2025-04-28 NOTE — PROGRESS NOTE ADULT - SUBJECTIVE AND OBJECTIVE BOX
Trauma Surgery Progress Note    SUBJECTIVE  The patient was seen and examined. No acute events overnight. Afebrile w/ stable vitals satting well on RA. Pulling 1200 on IS. Complains of mild pain at ribs.    OBJECTIVE  ___________________________________________________  VITAL SIGNS / I&O's   Vital Signs Last 24 Hrs  T(C): 36.7 (28 Apr 2025 07:00), Max: 36.9 (27 Apr 2025 19:00)  T(F): 98.1 (28 Apr 2025 07:00), Max: 98.5 (27 Apr 2025 19:00)  HR: 58 (28 Apr 2025 10:00) (56 - 73)  BP: 178/77 (28 Apr 2025 10:00) (148/66 - 191/77)  BP(mean): 111 (28 Apr 2025 10:00) (94 - 115)  RR: 20 (28 Apr 2025 10:00) (12 - 22)  SpO2: 95% (28 Apr 2025 10:00) (93% - 99%)    Parameters below as of 28 Apr 2025 09:47  Patient On (Oxygen Delivery Method): room air          27 Apr 2025 07:01  -  28 Apr 2025 07:00  --------------------------------------------------------  IN:    IV PiggyBack: 100 mL  Total IN: 100 mL    OUT:    Voided (mL): 750 mL  Total OUT: 750 mL    Total NET: -650 mL      28 Apr 2025 07:01  -  28 Apr 2025 10:43  --------------------------------------------------------  IN:    IV PiggyBack: 100 mL  Total IN: 100 mL    OUT:  Total OUT: 0 mL    Total NET: 100 mL        ___________________________________________________  PHYSICAL EXAM    -- CONSTITUTIONAL: NAD, lying in bed  -- NEURO: Awake, alert  -- HEENT: NC/AT  -- PULM: Non-labored respirations, equal chest rise bilaterally. Pulling 1200 on IS  -- EXTREMITIES: Warm and well perfused  -- PSYCH: Affect normal, A&Ox3    ___________________________________________________  LABS                        11.4   6.19  )-----------( 160      ( 27 Apr 2025 22:25 )             34.2     27 Apr 2025 22:25    140    |  108    |  11     ----------------------------<  115    3.9     |  23     |  0.93     Ca    8.6        27 Apr 2025 22:25  Phos  2.6       27 Apr 2025 22:25  Mg     2.1       27 Apr 2025 22:25    TPro  6.4    /  Alb  3.6    /  TBili  0.3    /  DBili  x      /  AST  18     /  ALT  12     /  AlkPhos  42     27 Apr 2025 22:25    PT/INR - ( 27 Apr 2025 22:25 )   PT: 11.4 sec;   INR: 0.99 ratio         PTT - ( 27 Apr 2025 22:25 )  PTT:29.5 sec  CAPILLARY BLOOD GLUCOSE            Urinalysis Basic - ( 27 Apr 2025 22:25 )    Color: x / Appearance: x / SG: x / pH: x  Gluc: 115 mg/dL / Ketone: x  / Bili: x / Urobili: x   Blood: x / Protein: x / Nitrite: x   Leuk Esterase: x / RBC: x / WBC x   Sq Epi: x / Non Sq Epi: x / Bacteria: x      ___________________________________________________  MICRO  Recent Cultures:    ___________________________________________________  MEDICATIONS  (STANDING):  acetaminophen   IVPB .. 1000 milliGRAM(s) IV Intermittent every 6 hours  amLODIPine   Tablet 5 milliGRAM(s) Oral daily  chlorhexidine 2% Cloths 1 Application(s) Topical <User Schedule>  enoxaparin Injectable 40 milliGRAM(s) SubCutaneous every 24 hours  lidocaine   4% Patch 1 Patch Transdermal every 24 hours  losartan 100 milliGRAM(s) Oral daily  metoprolol tartrate 25 milliGRAM(s) Oral every 12 hours  polyethylene glycol 3350 17 Gram(s) Oral every 12 hours  rosuvastatin 5 milliGRAM(s) Oral at bedtime  senna 2 Tablet(s) Oral at bedtime    MEDICATIONS  (PRN):  oxyCODONE    IR 2.5 milliGRAM(s) Oral every 3 hours PRN Moderate Pain (4 - 6)  oxyCODONE    IR 5 milliGRAM(s) Oral every 3 hours PRN Severe Pain (7 - 10)

## 2025-04-28 NOTE — PHYSICAL THERAPY INITIAL EVALUATION ADULT - PLANNED THERAPY INTERVENTIONS, PT EVAL
PT will DC pt from PT program at this time, remain on amb aide walking program, pt is functionally independent

## 2025-04-29 ENCOUNTER — TRANSCRIPTION ENCOUNTER (OUTPATIENT)
Age: 85
End: 2025-04-29

## 2025-05-01 ENCOUNTER — TRANSCRIPTION ENCOUNTER (OUTPATIENT)
Age: 85
End: 2025-05-01

## 2025-05-08 ENCOUNTER — TRANSCRIPTION ENCOUNTER (OUTPATIENT)
Age: 85
End: 2025-05-08

## 2025-05-13 ENCOUNTER — TRANSCRIPTION ENCOUNTER (OUTPATIENT)
Age: 85
End: 2025-05-13

## 2025-05-19 NOTE — ASU DISCHARGE PLAN (ADULT/PEDIATRIC) - DISCHARGE PLAN IS COMPLETE AND GIVEN TO PATIENT
[EKG obtained to assist in diagnosis and management of assessed problem(s)] : EKG obtained to assist in diagnosis and management of assessed problem(s) [FreeTextEntry1] : Continue medical therapy. Patient tolerated his urologic procedure from a cardiac standpoint. EKG: NSR, rate of 70 bpm , no ischemic changes noted, no ectopy noted. Patient was instructed to target his T. Cholesterol to less than 200 mg/dl and LDL cholesterol to less than 70 mg/dl. He is at target goal with respect to his lipid levels.  Exercise and weight loss was advised. Maintain cardiac medications.  EKG was reviewed and interpreted with the patient. BMP, CBC, Fasting lipid panel, hepatic profile PT/PTT/INR is advised preoperatively Maintain metoprolol ER at 50 mg QD RV in 6 months. : Yes

## 2025-05-20 ENCOUNTER — TRANSCRIPTION ENCOUNTER (OUTPATIENT)
Age: 85
End: 2025-05-20

## 2025-08-19 ENCOUNTER — OUTPATIENT (OUTPATIENT)
Dept: OUTPATIENT SERVICES | Facility: HOSPITAL | Age: 85
LOS: 1 days | End: 2025-08-19
Payer: COMMERCIAL

## 2025-08-19 DIAGNOSIS — Z98.890 OTHER SPECIFIED POSTPROCEDURAL STATES: Chronic | ICD-10-CM

## 2025-08-19 DIAGNOSIS — Z98.49 CATARACT EXTRACTION STATUS, UNSPECIFIED EYE: Chronic | ICD-10-CM

## 2025-08-19 DIAGNOSIS — S22.49XA MULTIPLE FRACTURES OF RIBS, UNSPECIFIED SIDE, INITIAL ENCOUNTER FOR CLOSED FRACTURE: ICD-10-CM

## 2025-08-19 DIAGNOSIS — Z00.00 ENCOUNTER FOR GENERAL ADULT MEDICAL EXAMINATION WITHOUT ABNORMAL FINDINGS: ICD-10-CM

## 2025-08-19 PROCEDURE — 71100 X-RAY EXAM RIBS UNI 2 VIEWS: CPT

## 2025-08-19 PROCEDURE — 71100 X-RAY EXAM RIBS UNI 2 VIEWS: CPT | Mod: 26

## 2025-09-12 ENCOUNTER — RESULT REVIEW (OUTPATIENT)
Age: 85
End: 2025-09-12

## (undated) DEVICE — KNIFE FULL HANDLE ANGLE 2.75MM

## (undated) DEVICE — SYR LUER LOK 20CC

## (undated) DEVICE — KNIFE SIDEPORT ANG W/ SAFETY 20G

## (undated) DEVICE — GLV 6.5 PROTEXIS

## (undated) DEVICE — AID SURG OPTH OCUCOAT 20MG/ML

## (undated) DEVICE — NDL HYPO SAFE 18G X 1.5"

## (undated) DEVICE — DRSG STERISTRIPS 0.5X4"

## (undated) DEVICE — Device

## (undated) DEVICE — GLV 8 ULTRAFREE MAX

## (undated) DEVICE — KNIFE OPTH SHARPOINT CRESCENT 2MM

## (undated) DEVICE — PACK CATARACT

## (undated) DEVICE — SOL IRR POUR H2O 1000ML

## (undated) DEVICE — SUT VICRYL 10-0 12" CS160-8 DA

## (undated) DEVICE — CAPSULE POLISHER 23GX7/8"

## (undated) DEVICE — DRSG TEGADERM 2.5X3"

## (undated) DEVICE — ALCON MONARCH II IOL DELIVERY SYSTEM B

## (undated) DEVICE — CANNULA BD & CO SUBTENONS

## (undated) DEVICE — CANNULA HYDRODISCT NUC 27GX22MM